# Patient Record
Sex: FEMALE | Race: WHITE | NOT HISPANIC OR LATINO | Employment: UNEMPLOYED | URBAN - METROPOLITAN AREA
[De-identification: names, ages, dates, MRNs, and addresses within clinical notes are randomized per-mention and may not be internally consistent; named-entity substitution may affect disease eponyms.]

---

## 2018-10-31 ENCOUNTER — APPOINTMENT (OUTPATIENT)
Dept: RADIOLOGY | Facility: CLINIC | Age: 53
End: 2018-10-31
Payer: COMMERCIAL

## 2018-10-31 ENCOUNTER — OFFICE VISIT (OUTPATIENT)
Dept: URGENT CARE | Facility: CLINIC | Age: 53
End: 2018-10-31
Payer: COMMERCIAL

## 2018-10-31 VITALS
HEART RATE: 80 BPM | TEMPERATURE: 98.4 F | BODY MASS INDEX: 18.32 KG/M2 | WEIGHT: 114 LBS | DIASTOLIC BLOOD PRESSURE: 60 MMHG | SYSTOLIC BLOOD PRESSURE: 108 MMHG | RESPIRATION RATE: 20 BRPM | HEIGHT: 66 IN | OXYGEN SATURATION: 98 %

## 2018-10-31 DIAGNOSIS — M79.671 RIGHT FOOT PAIN: Primary | ICD-10-CM

## 2018-10-31 DIAGNOSIS — M79.671 RIGHT FOOT PAIN: ICD-10-CM

## 2018-10-31 LAB
SL AMB  POCT GLUCOSE, UA: 0
SL AMB LEUKOCYTE ESTERASE,UA: 0
SL AMB POCT BILIRUBIN,UA: 0
SL AMB POCT BLOOD,UA: ABNORMAL
SL AMB POCT CLARITY,UA: CLEAR
SL AMB POCT COLOR,UA: ABNORMAL
SL AMB POCT KETONES,UA: 0
SL AMB POCT NITRITE,UA: 0
SL AMB POCT PH,UA: 0
SL AMB POCT SPECIFIC GRAVITY,UA: 0
SL AMB POCT URINE PROTEIN: 0
SL AMB POCT UROBILINOGEN: 0

## 2018-10-31 PROCEDURE — G0382 LEV 3 HOSP TYPE B ED VISIT: HCPCS | Performed by: PHYSICIAN ASSISTANT

## 2018-10-31 PROCEDURE — 99283 EMERGENCY DEPT VISIT LOW MDM: CPT | Performed by: PHYSICIAN ASSISTANT

## 2018-10-31 PROCEDURE — 73630 X-RAY EXAM OF FOOT: CPT

## 2018-10-31 RX ORDER — CIPROFLOXACIN 500 MG/1
500 TABLET, FILM COATED ORAL EVERY 12 HOURS SCHEDULED
Qty: 14 TABLET | Refills: 0 | Status: SHIPPED | OUTPATIENT
Start: 2018-10-31 | End: 2018-10-31 | Stop reason: CLARIF

## 2018-10-31 NOTE — PROGRESS NOTES
[facility]        NAME: Amy Jackson is a 47 y o  female  : 1965    MRN: [id number]  DATE: 2018  TIME: 1:37 PM    Assessment and Plan   Right foot pain [M79 [id number]]  1  Right foot pain  POCT urine dip    XR foot 3+ vw right    CANCELED: POCT urine dip auto non-scope    CANCELED: Urine culture    CANCELED: Urine culture         Patient Instructions       Follow up with podiatry if no improvement  Proceed to ER if symptoms worsen  Chief Complaint     Chief Complaint   Patient presents with    Foot Pain     started Saturday night seen in urgent care in [facility] to have pain and numbness         History of Present Illness       Patient presents with a four-day history of pain in her right foot  She states that the the pain started while walking around with and without shoes and it was on the plantar aspect beneath her 2nd and 3rd toes  Patient denies any known trauma, however, she broke a classification 2 weeks ago and not sure if she could have possibly stepped on a piece of glass  The foot now has lot ecchymosis and now has numbness over the 2nd toe which radiates up her right leg  Review of Systems   Review of Systems   Constitutional: Negative for fever  HENT: Negative for sore throat  Eyes: Negative for redness  Skin: Negative for rash  Neurological: Positive for numbness (Right foot)  Hematological: Negative for adenopathy  Current Medications     No current outpatient prescriptions on file  Current Allergies     Allergies as of 10/31/2018    (No Known Allergies)            The following portions of the patient's history were reviewed and updated as appropriate: allergies, current medications, past family history, past medical history, past social history, past surgical history and problem list      Past Medical History:   Diagnosis Date    PTSD (post-traumatic stress disorder)        History reviewed   No pertinent surgical history  No family history on file  Medications have been verified  Objective   /60   Pulse 80   Temp 98 4 °F (36 9 °C) (Tympanic)   Resp 20   Ht 5' 6" (1 676 m)   Wt 51 7 kg (114 lb)   SpO2 98%   BMI 18 40 kg/m²        Physical Exam     Physical Exam   Constitutional: She is oriented to person, place, and time  She appears well-developed and well-nourished  HENT:   Head: Normocephalic and atraumatic  Neck: No tracheal deviation present  Cardiovascular: Normal rate and regular rhythm  Pulmonary/Chest: Effort normal    Abdominal: Soft  Suprapubic tenderness no CVA tenderness  Musculoskeletal:   Ecchymosis over the distal 1st through 2nd metatarsals  There is no swelling, warmth or erythema to the right foot  Dorsalis pedis pulse is palpable  There is no evidence any plantar puncture wounds  Neurological: She is alert and oriented to person, place, and time  Skin: Skin is warm and dry  No rash noted  Psychiatric: She has a normal mood and affect  Her behavior is normal  Judgment and thought content normal    Nursing note and vitals reviewed  Right foot x-ray viewed by me and independently reviewed by me contemporaneously as no acute bony abnormality or radiopaque foreign body

## 2018-11-05 ENCOUNTER — OFFICE VISIT (OUTPATIENT)
Dept: FAMILY MEDICINE CLINIC | Facility: CLINIC | Age: 53
End: 2018-11-05
Payer: COMMERCIAL

## 2018-11-05 VITALS
BODY MASS INDEX: 18.68 KG/M2 | HEIGHT: 66 IN | TEMPERATURE: 98.2 F | DIASTOLIC BLOOD PRESSURE: 68 MMHG | WEIGHT: 116.2 LBS | SYSTOLIC BLOOD PRESSURE: 110 MMHG

## 2018-11-05 DIAGNOSIS — M79.671 RIGHT FOOT PAIN: ICD-10-CM

## 2018-11-05 DIAGNOSIS — F32.A DEPRESSION, UNSPECIFIED DEPRESSION TYPE: ICD-10-CM

## 2018-11-05 DIAGNOSIS — Z00.00 ANNUAL PHYSICAL EXAM: Primary | ICD-10-CM

## 2018-11-05 PROCEDURE — 99396 PREV VISIT EST AGE 40-64: CPT | Performed by: FAMILY MEDICINE

## 2018-11-05 NOTE — PROGRESS NOTES
Assessment/Plan:    No problem-specific Assessment & Plan notes found for this encounter  Diagnoses and all orders for this visit:    Annual physical exam  -     CBC and differential; Future  -     Comprehensive metabolic panel; Future  -     Lipid panel; Future  -     TSH, 3rd generation with Free T4 reflex; Future    Right foot pain  -     Ambulatory referral to Podiatry; Future    Depression, unspecified depression type  Comments:  pt refuses medication          Subjective:      Patient ID: Jenaro Mathur is a 47 y o  female  New pt here for annual physical with depression, anxiety and PTSD, pt has not been on any medication and is against the use of pharmacutical medication, pt complains of foot pain in the bottom of the right foot it started 1 week ago she went to an urgent care and it is getting better but she wants to see a podiatrist        The following portions of the patient's history were reviewed and updated as appropriate: allergies, current medications, past family history, past medical history, past social history, past surgical history and problem list     Review of Systems   Constitutional: Positive for fatigue  Negative for chills and fever  HENT: Negative  Eyes: Negative  Respiratory: Negative for shortness of breath and wheezing  Cardiovascular: Negative for chest pain and palpitations  Gastrointestinal: Negative for abdominal pain, blood in stool, constipation, diarrhea, nausea and vomiting  Endocrine: Negative  Genitourinary: Negative for dysuria  Musculoskeletal: Negative for arthralgias and myalgias  Skin: Negative  Allergic/Immunologic: Negative  Neurological: Negative for seizures and syncope  Hematological: Negative for adenopathy  Psychiatric/Behavioral: Negative            Objective:      /68 (BP Location: Left arm, Patient Position: Sitting, Cuff Size: Adult)   Temp 98 2 °F (36 8 °C) (Tympanic)   Ht 5' 6" (1 676 m)   Wt 52 7 kg (116 lb 3 2 oz)   BMI 18 76 kg/m²          Physical Exam   Constitutional: She is oriented to person, place, and time  She appears well-developed and well-nourished  No distress  HENT:   Head: Normocephalic and atraumatic  Right Ear: External ear normal    Left Ear: External ear normal    Nose: Nose normal    Mouth/Throat: Oropharynx is clear and moist    Eyes: Pupils are equal, round, and reactive to light  Conjunctivae and EOM are normal  No scleral icterus  Neck: Normal range of motion  Neck supple  Cardiovascular: Normal rate, regular rhythm and normal heart sounds  No murmur heard  Pulmonary/Chest: Effort normal and breath sounds normal  No respiratory distress  She has no wheezes  She has no rales  Abdominal: Soft  Bowel sounds are normal  She exhibits no distension and no mass  There is no tenderness  There is no rebound and no guarding  Musculoskeletal: Normal range of motion  She exhibits no edema  Lymphadenopathy:     She has no cervical adenopathy  Neurological: She is alert and oriented to person, place, and time  She has normal reflexes  She exhibits normal muscle tone  Skin: Skin is warm and dry  No rash noted  She is not diaphoretic  No erythema  No pallor  Psychiatric: She has a normal mood and affect  Her behavior is normal  Judgment and thought content normal    Nursing note and vitals reviewed

## 2018-12-03 ENCOUNTER — TRANSCRIBE ORDERS (OUTPATIENT)
Dept: ADMINISTRATIVE | Facility: HOSPITAL | Age: 53
End: 2018-12-03

## 2018-12-03 DIAGNOSIS — I73.9 PAD (PERIPHERAL ARTERY DISEASE) (HCC): Primary | ICD-10-CM

## 2018-12-03 DIAGNOSIS — M79.671 RIGHT FOOT PAIN: Primary | ICD-10-CM

## 2018-12-04 ENCOUNTER — APPOINTMENT (OUTPATIENT)
Dept: LAB | Facility: CLINIC | Age: 53
End: 2018-12-04
Payer: COMMERCIAL

## 2018-12-04 DIAGNOSIS — Z00.00 ANNUAL PHYSICAL EXAM: ICD-10-CM

## 2018-12-04 LAB
ALBUMIN SERPL BCP-MCNC: 3.6 G/DL (ref 3.5–5)
ALP SERPL-CCNC: 75 U/L (ref 46–116)
ALT SERPL W P-5'-P-CCNC: 29 U/L (ref 12–78)
ANION GAP SERPL CALCULATED.3IONS-SCNC: 5 MMOL/L (ref 4–13)
AST SERPL W P-5'-P-CCNC: 21 U/L (ref 5–45)
BASOPHILS # BLD AUTO: 0.05 THOUSANDS/ΜL (ref 0–0.1)
BASOPHILS NFR BLD AUTO: 1 % (ref 0–1)
BILIRUB SERPL-MCNC: 0.53 MG/DL (ref 0.2–1)
BUN SERPL-MCNC: 7 MG/DL (ref 5–25)
CALCIUM SERPL-MCNC: 9.8 MG/DL (ref 8.3–10.1)
CHLORIDE SERPL-SCNC: 102 MMOL/L (ref 100–108)
CHOLEST SERPL-MCNC: 194 MG/DL (ref 50–200)
CO2 SERPL-SCNC: 31 MMOL/L (ref 21–32)
CREAT SERPL-MCNC: 0.75 MG/DL (ref 0.6–1.3)
EOSINOPHIL # BLD AUTO: 0.06 THOUSAND/ΜL (ref 0–0.61)
EOSINOPHIL NFR BLD AUTO: 1 % (ref 0–6)
ERYTHROCYTE [DISTWIDTH] IN BLOOD BY AUTOMATED COUNT: 12.3 % (ref 11.6–15.1)
GFR SERPL CREATININE-BSD FRML MDRD: 91 ML/MIN/1.73SQ M
GLUCOSE P FAST SERPL-MCNC: 93 MG/DL (ref 65–99)
HCT VFR BLD AUTO: 42.5 % (ref 34.8–46.1)
HDLC SERPL-MCNC: 49 MG/DL (ref 40–60)
HGB BLD-MCNC: 14.2 G/DL (ref 11.5–15.4)
IMM GRANULOCYTES # BLD AUTO: 0 THOUSAND/UL (ref 0–0.2)
IMM GRANULOCYTES NFR BLD AUTO: 0 % (ref 0–2)
LDLC SERPL CALC-MCNC: 119 MG/DL (ref 0–100)
LYMPHOCYTES # BLD AUTO: 1.7 THOUSANDS/ΜL (ref 0.6–4.47)
LYMPHOCYTES NFR BLD AUTO: 34 % (ref 14–44)
MCH RBC QN AUTO: 31.4 PG (ref 26.8–34.3)
MCHC RBC AUTO-ENTMCNC: 33.4 G/DL (ref 31.4–37.4)
MCV RBC AUTO: 94 FL (ref 82–98)
MONOCYTES # BLD AUTO: 0.37 THOUSAND/ΜL (ref 0.17–1.22)
MONOCYTES NFR BLD AUTO: 7 % (ref 4–12)
NEUTROPHILS # BLD AUTO: 2.86 THOUSANDS/ΜL (ref 1.85–7.62)
NEUTS SEG NFR BLD AUTO: 57 % (ref 43–75)
NONHDLC SERPL-MCNC: 145 MG/DL
NRBC BLD AUTO-RTO: 0 /100 WBCS
PLATELET # BLD AUTO: 198 THOUSANDS/UL (ref 149–390)
PMV BLD AUTO: 10.3 FL (ref 8.9–12.7)
POTASSIUM SERPL-SCNC: 4.2 MMOL/L (ref 3.5–5.3)
PROT SERPL-MCNC: 7 G/DL (ref 6.4–8.2)
RBC # BLD AUTO: 4.52 MILLION/UL (ref 3.81–5.12)
SODIUM SERPL-SCNC: 138 MMOL/L (ref 136–145)
T4 FREE SERPL-MCNC: 0.8 NG/DL (ref 0.76–1.46)
TRIGL SERPL-MCNC: 130 MG/DL
TSH SERPL DL<=0.05 MIU/L-ACNC: 7.72 UIU/ML (ref 0.36–3.74)
WBC # BLD AUTO: 5.04 THOUSAND/UL (ref 4.31–10.16)

## 2018-12-04 PROCEDURE — 84439 ASSAY OF FREE THYROXINE: CPT

## 2018-12-04 PROCEDURE — 80053 COMPREHEN METABOLIC PANEL: CPT

## 2018-12-04 PROCEDURE — 36415 COLL VENOUS BLD VENIPUNCTURE: CPT

## 2018-12-04 PROCEDURE — 84443 ASSAY THYROID STIM HORMONE: CPT

## 2018-12-04 PROCEDURE — 80061 LIPID PANEL: CPT

## 2018-12-04 PROCEDURE — 85025 COMPLETE CBC W/AUTO DIFF WBC: CPT

## 2018-12-11 ENCOUNTER — HOSPITAL ENCOUNTER (OUTPATIENT)
Dept: MRI IMAGING | Facility: HOSPITAL | Age: 53
Discharge: HOME | End: 2018-12-11
Payer: COMMERCIAL

## 2018-12-11 DIAGNOSIS — M79.671 RIGHT FOOT PAIN: ICD-10-CM

## 2018-12-11 PROCEDURE — 73718 MRI LOWER EXTREMITY W/O DYE: CPT

## 2018-12-13 ENCOUNTER — TELEPHONE (OUTPATIENT)
Dept: FAMILY MEDICINE CLINIC | Facility: CLINIC | Age: 53
End: 2018-12-13

## 2018-12-13 ENCOUNTER — HOSPITAL ENCOUNTER (OUTPATIENT)
Dept: NON INVASIVE DIAGNOSTICS | Facility: HOSPITAL | Age: 53
Discharge: HOME | End: 2018-12-13
Payer: COMMERCIAL

## 2018-12-13 DIAGNOSIS — I73.9 PAD (PERIPHERAL ARTERY DISEASE) (HCC): ICD-10-CM

## 2018-12-13 PROCEDURE — 93923 UPR/LXTR ART STDY 3+ LVLS: CPT | Performed by: SURGERY

## 2018-12-13 PROCEDURE — 93923 UPR/LXTR ART STDY 3+ LVLS: CPT

## 2019-03-08 ENCOUNTER — HOSPITAL ENCOUNTER (INPATIENT)
Facility: HOSPITAL | Age: 54
LOS: 4 days | Discharge: HOME | DRG: 751 | End: 2019-03-12
Attending: EMERGENCY MEDICINE | Admitting: PSYCHIATRY & NEUROLOGY
Payer: COMMERCIAL

## 2019-03-08 DIAGNOSIS — E53.8 B12 DEFICIENCY: ICD-10-CM

## 2019-03-08 DIAGNOSIS — E55.9 VITAMIN D DEFICIENCY: ICD-10-CM

## 2019-03-08 DIAGNOSIS — F32.A DEPRESSION: Primary | ICD-10-CM

## 2019-03-08 LAB
ALBUMIN SERPL BCP-MCNC: 4 G/DL (ref 3.5–5.7)
ALP SERPL-CCNC: 51 U/L (ref 40–150)
ALT SERPL W P-5'-P-CCNC: 13 U/L (ref 7–52)
AMPHETAMINES SERPL QL SCN: NEGATIVE
ANION GAP SERPL CALCULATED.3IONS-SCNC: 5 MMOL/L (ref 4–13)
AST SERPL W P-5'-P-CCNC: 21 U/L (ref 13–39)
BARBITURATES UR QL: NEGATIVE
BASOPHILS # BLD AUTO: 0 THOUSANDS/ΜL (ref 0–0.1)
BASOPHILS NFR BLD AUTO: 1 % (ref 0–2)
BENZODIAZ UR QL: NEGATIVE
BILIRUB SERPL-MCNC: 0.5 MG/DL (ref 0.2–1)
BILIRUB UR QL STRIP: NEGATIVE
BUN SERPL-MCNC: 6 MG/DL (ref 7–25)
CALCIUM SERPL-MCNC: 9.3 MG/DL (ref 8.6–10.5)
CHLORIDE SERPL-SCNC: 105 MMOL/L (ref 98–107)
CLARITY UR: CLEAR
CO2 SERPL-SCNC: 29 MMOL/L (ref 21–31)
COCAINE UR QL: NEGATIVE
COLOR UR: ABNORMAL
CREAT SERPL-MCNC: 0.63 MG/DL (ref 0.6–1.2)
EOSINOPHIL # BLD AUTO: 0.1 THOUSAND/ΜL (ref 0–0.61)
EOSINOPHIL NFR BLD AUTO: 2 % (ref 0–5)
ERYTHROCYTE [DISTWIDTH] IN BLOOD BY AUTOMATED COUNT: 13.1 % (ref 11.5–14.5)
ETHANOL SERPL-MCNC: <10 MG/DL
EXT PREG TEST URINE: NEGATIVE
GFR SERPL CREATININE-BSD FRML MDRD: 102 ML/MIN/1.73SQ M
GLUCOSE SERPL-MCNC: 104 MG/DL (ref 65–99)
GLUCOSE UR STRIP-MCNC: NEGATIVE MG/DL
HCT VFR BLD AUTO: 40.9 % (ref 42–47)
HGB BLD-MCNC: 13.5 G/DL (ref 12–16)
HGB UR QL STRIP.AUTO: NEGATIVE
KETONES UR STRIP-MCNC: NEGATIVE MG/DL
LEUKOCYTE ESTERASE UR QL STRIP: NEGATIVE
LYMPHOCYTES # BLD AUTO: 1.4 THOUSANDS/ΜL (ref 0.6–4.47)
LYMPHOCYTES NFR BLD AUTO: 29 % (ref 21–51)
MCH RBC QN AUTO: 31.3 PG (ref 26–34)
MCHC RBC AUTO-ENTMCNC: 33.1 G/DL (ref 31–37)
MCV RBC AUTO: 95 FL (ref 81–99)
METHADONE UR QL: NEGATIVE
MONOCYTES # BLD AUTO: 0.4 THOUSAND/ΜL (ref 0.17–1.22)
MONOCYTES NFR BLD AUTO: 7 % (ref 2–12)
NEUTROPHILS # BLD AUTO: 2.8 THOUSANDS/ΜL (ref 1.4–6.5)
NEUTS SEG NFR BLD AUTO: 60 % (ref 42–75)
NITRITE UR QL STRIP: NEGATIVE
NRBC BLD AUTO-RTO: 0 /100 WBCS
OPIATES UR QL SCN: NEGATIVE
PCP UR QL: NEGATIVE
PH UR STRIP.AUTO: 7 [PH]
PLATELET # BLD AUTO: 199 THOUSANDS/UL (ref 149–390)
PMV BLD AUTO: 8.4 FL (ref 8.6–11.7)
POTASSIUM SERPL-SCNC: 4 MMOL/L (ref 3.5–5.5)
PROT SERPL-MCNC: 6.7 G/DL (ref 6.4–8.9)
PROT UR STRIP-MCNC: NEGATIVE MG/DL
RBC # BLD AUTO: 4.33 MILLION/UL (ref 3.9–5.2)
SODIUM SERPL-SCNC: 139 MMOL/L (ref 134–143)
SP GR UR STRIP.AUTO: <=1.005 (ref 1–1.03)
THC UR QL: NEGATIVE
UROBILINOGEN UR QL STRIP.AUTO: 0.2 E.U./DL
WBC # BLD AUTO: 4.7 THOUSAND/UL (ref 4.8–10.8)

## 2019-03-08 PROCEDURE — 80320 DRUG SCREEN QUANTALCOHOLS: CPT | Performed by: EMERGENCY MEDICINE

## 2019-03-08 PROCEDURE — 81025 URINE PREGNANCY TEST: CPT | Performed by: EMERGENCY MEDICINE

## 2019-03-08 PROCEDURE — 85025 COMPLETE CBC W/AUTO DIFF WBC: CPT | Performed by: EMERGENCY MEDICINE

## 2019-03-08 PROCEDURE — 99285 EMERGENCY DEPT VISIT HI MDM: CPT

## 2019-03-08 PROCEDURE — 36415 COLL VENOUS BLD VENIPUNCTURE: CPT | Performed by: EMERGENCY MEDICINE

## 2019-03-08 PROCEDURE — 80053 COMPREHEN METABOLIC PANEL: CPT | Performed by: EMERGENCY MEDICINE

## 2019-03-08 PROCEDURE — 81003 URINALYSIS AUTO W/O SCOPE: CPT | Performed by: EMERGENCY MEDICINE

## 2019-03-08 PROCEDURE — 93005 ELECTROCARDIOGRAM TRACING: CPT

## 2019-03-08 PROCEDURE — 80307 DRUG TEST PRSMV CHEM ANLYZR: CPT | Performed by: EMERGENCY MEDICINE

## 2019-03-08 RX ORDER — LORAZEPAM 2 MG/ML
1 INJECTION INTRAMUSCULAR EVERY 4 HOURS PRN
Status: DISCONTINUED | OUTPATIENT
Start: 2019-03-08 | End: 2019-03-12 | Stop reason: HOSPADM

## 2019-03-08 RX ORDER — HYDROXYZINE HYDROCHLORIDE 25 MG/1
25 TABLET, FILM COATED ORAL EVERY 4 HOURS PRN
Status: DISCONTINUED | OUTPATIENT
Start: 2019-03-08 | End: 2019-03-12 | Stop reason: HOSPADM

## 2019-03-08 RX ORDER — MAGNESIUM HYDROXIDE/ALUMINUM HYDROXICE/SIMETHICONE 120; 1200; 1200 MG/30ML; MG/30ML; MG/30ML
30 SUSPENSION ORAL EVERY 4 HOURS PRN
Status: DISCONTINUED | OUTPATIENT
Start: 2019-03-08 | End: 2019-03-12 | Stop reason: HOSPADM

## 2019-03-08 RX ORDER — ACETAMINOPHEN 325 MG/1
975 TABLET ORAL EVERY 6 HOURS PRN
Status: DISCONTINUED | OUTPATIENT
Start: 2019-03-08 | End: 2019-03-12 | Stop reason: HOSPADM

## 2019-03-08 RX ORDER — TRAZODONE HYDROCHLORIDE 50 MG/1
50 TABLET ORAL
Status: DISCONTINUED | OUTPATIENT
Start: 2019-03-08 | End: 2019-03-12 | Stop reason: HOSPADM

## 2019-03-08 RX ORDER — ACETAMINOPHEN 325 MG/1
650 TABLET ORAL EVERY 6 HOURS PRN
Status: DISCONTINUED | OUTPATIENT
Start: 2019-03-08 | End: 2019-03-12 | Stop reason: HOSPADM

## 2019-03-08 RX ORDER — LORAZEPAM 0.5 MG/1
0.5 TABLET ORAL EVERY 4 HOURS PRN
Status: DISCONTINUED | OUTPATIENT
Start: 2019-03-08 | End: 2019-03-12 | Stop reason: HOSPADM

## 2019-03-08 RX ORDER — ACETAMINOPHEN 325 MG/1
650 TABLET ORAL EVERY 4 HOURS PRN
Status: DISCONTINUED | OUTPATIENT
Start: 2019-03-08 | End: 2019-03-12 | Stop reason: HOSPADM
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
## 2019-03-08 NOTE — ED NOTES
Pt's facesheet and 201 were faxed to intake/referral dept for bed consideration at [facility]   Medical clearance note is in chart, however, additional labs were ordered since pt is being referred to [facility] and we are waiting final results

## 2019-03-08 NOTE — ED PROVIDER NOTES
History  Chief Complaint   Patient presents with    Psychiatric Evaluation     71-year-old female with history of PTSD and depression presents for psychiatric evaluation  Patient reports depression with increased suicidal ideations and plan to just end it " Patient denies homicidal ideations an audiovisual hallucinations  Patient reports no drug or alcohol abuse  Patient with no other recent trauma or complaints at this time      History provided by:  Patient   used: No    Psychiatric Evaluation   Presenting symptoms: suicidal thoughts    Associated symptoms: no abdominal pain, no chest pain and no headaches        None       Past Medical History:   Diagnosis Date    Depression     PTSD (post-traumatic stress disorder)        No past surgical history on file  History reviewed  No pertinent family history  I have reviewed and agree with the history as documented  Social History     Tobacco Use    Smoking status: Never Smoker    Smokeless tobacco: Never Used   Substance Use Topics    Alcohol use: No    Drug use: No        Review of Systems   Constitutional: Negative for chills and fever  HENT: Negative for rhinorrhea and sore throat  Eyes: Negative for visual disturbance  Respiratory: Negative for cough and shortness of breath  Cardiovascular: Negative for chest pain and leg swelling  Gastrointestinal: Negative for abdominal pain, diarrhea, nausea and vomiting  Genitourinary: Negative for dysuria  Musculoskeletal: Negative for back pain and myalgias  Skin: Negative for rash  Neurological: Negative for dizziness and headaches  Psychiatric/Behavioral: Positive for suicidal ideas  Negative for confusion  All other systems reviewed and are negative  Physical Exam  Physical Exam   Constitutional: She is oriented to person, place, and time  She appears well-developed and well-nourished     HENT:   Nose: Nose normal    Mouth/Throat: Oropharynx is clear and moist  No oropharyngeal exudate  Eyes: Pupils are equal, round, and reactive to light  Conjunctivae and EOM are normal  No scleral icterus  Neck: Normal range of motion  Neck supple  No JVD present  No tracheal deviation present  Cardiovascular: Normal rate, regular rhythm and normal heart sounds  No murmur heard  Pulmonary/Chest: Effort normal and breath sounds normal  No respiratory distress  She has no wheezes  She has no rales  Abdominal: Soft  Bowel sounds are normal  There is no tenderness  There is no guarding  Musculoskeletal: Normal range of motion  She exhibits no edema or tenderness  Neurological: She is alert and oriented to person, place, and time  No cranial nerve deficit or sensory deficit  She exhibits normal muscle tone  5/5 motor, nl sens   Skin: Skin is warm and dry  Psychiatric: Her speech is normal and behavior is normal  Cognition and memory are normal  She does not express impulsivity  She exhibits a depressed mood  She expresses suicidal ideation  She expresses no homicidal ideation  She expresses suicidal plans  She expresses no homicidal plans  Nursing note and vitals reviewed        Vital Signs  ED Triage Vitals [03/08/19 1205]   Temperature Pulse Respirations Blood Pressure SpO2   98 3 °F (36 8 °C) 72 18 108/64 96 %      Temp Source Heart Rate Source Patient Position - Orthostatic VS BP Location FiO2 (%)   Temporal Monitor Lying Right arm --      Pain Score       No Pain           Vitals:    03/08/19 1205   BP: 108/64   Pulse: 72   Patient Position - Orthostatic VS: Lying       Visual Acuity  Visual Acuity      Most Recent Value   L Pupil Size (mm)  4   R Pupil Size (mm)  4          ED Medications  Medications - No data to display    Diagnostic Studies  Results Reviewed     Procedure Component Value Units Date/Time    POCT pregnancy, urine [[id number]]  (Normal) Resulted:  03/08/19 1521    Lab Status:  Final result Updated:  03/08/19 1522     EXT PREG TEST UR (Ref: Negative) negative    Comprehensive metabolic panel [[id number]]  (Abnormal) Collected:  03/08/19 1445    Lab Status:  Final result Specimen:  Blood from Arm, Right Updated:  03/08/19 1511     Sodium 139 mmol/L      Potassium 4 0 mmol/L      Chloride 105 mmol/L      CO2 29 mmol/L      ANION GAP 5 mmol/L      BUN 6 mg/dL      Creatinine 0 63 mg/dL      Glucose 104 mg/dL      Calcium 9 3 mg/dL      AST 21 U/L      ALT 13 U/L      Alkaline Phosphatase 51 U/L      Total Protein 6 7 g/dL      Albumin 4 0 g/dL      Total Bilirubin 0 50 mg/dL      eGFR 102 ml/min/1 73sq m     Narrative:       National Kidney Disease Education Program recommendations are as follows:  GFR calculation is accurate only with a steady state creatinine  Chronic Kidney disease less than 60 ml/min/1 73 sq  meters  Kidney failure less than 15 ml/min/1 73 sq  meters      CBC and differential [[id number]]  (Abnormal) Collected:  03/08/19 1445    Lab Status:  Final result Specimen:  Blood from Arm, Right Updated:  03/08/19 1457     WBC 4 70 Thousand/uL      RBC 4 33 Million/uL      Hemoglobin 13 5 g/dL      Hematocrit 40 9 %      MCV 95 fL      MCH 31 3 pg      MCHC 33 1 g/dL      RDW 13 1 %      MPV 8 4 fL      Platelets 099 Thousands/uL      nRBC 0 /100 WBCs      Neutrophils Relative 60 %      Lymphocytes Relative 29 %      Monocytes Relative 7 %      Eosinophils Relative 2 %      Basophils Relative 1 %      Neutrophils Absolute 2 80 Thousands/µL      Lymphocytes Absolute 1 40 Thousands/µL      Monocytes Absolute 0 40 Thousand/µL      Eosinophils Absolute 0 10 Thousand/µL      Basophils Absolute 0 00 Thousands/µL     Ethanol [[id number]]  (Normal) Collected:  03/08/19 1309    Lab Status:  Final result Specimen:  Blood from Arm, Right Updated:  03/08/19 1339     Ethanol Lvl <10 mg/dL     Rapid drug screen, urine [[id number]]  (Normal) Collected:  03/08/19 1235    Lab Status:  Final result Specimen:  Urine Updated:  03/08/19 1255     Amph/Meth UR Negative Barbiturate Ur Negative     Benzodiazepine Urine Negative     Cocaine Urine Negative     Methadone Urine Negative     Opiate Urine Negative     PCP Ur Negative     THC Urine Negative    Narrative:       FOR MEDICAL PURPOSES ONLY  IF CONFIRMATION NEEDED PLEASE CONTACT THE LAB WITHIN 5 DAYS  Drug Screen Cutoff Levels:  AMPHETAMINE/METHAMPHETAMINES  1000 ng/mL  BARBITURATES     200 ng/mL  BENZODIAZEPINES     200 ng/mL  COCAINE      300 ng/mL  METHADONE      300 ng/mL  OPIATES      300 ng/mL  PHENCYCLIDINE     25 ng/mL  THC       50 ng/mL    UA w Reflex to Microscopic w Reflex to Culture [[id number]]  (Abnormal) Collected:  03/08/19 1225    Lab Status:  Final result Specimen:  Urine, Clean Catch Updated:  03/08/19 1237     Color, UA Straw     Clarity, UA Clear     Specific Gravity, UA <=1 005     pH, UA 7 0     Leukocytes, UA Negative     Nitrite, UA Negative     Protein, UA Negative mg/dl      Glucose, UA Negative mg/dl      Ketones, UA Negative mg/dl      Urobilinogen, UA 0 2 E U /dl      Bilirubin, UA Negative     Blood, UA Negative                 No orders to display              Procedures  ECG 12 Lead Documentation  Date/Time: 3/8/2019 2:57 PM  Performed by: Harini Quevedo DO  Authorized by: Harini Quevedo DO     Indications / Diagnosis:  Psych eval  ECG reviewed by me, the ED Provider: yes    Patient location:  ED  Previous ECG:     Previous ECG:  Unavailable    Comparison to cardiac monitor: Yes    Interpretation:     Interpretation: normal    Rate:     ECG rate:  72 BPM    ECG rate assessment: normal    Rhythm:     Rhythm: sinus rhythm    Ectopy:     Ectopy: none    QRS:     QRS axis:  Normal  Conduction:     Conduction: normal    ST segments:     ST segments:  Normal           Phone Contacts  ED Phone Contact    ED Course  ED Course as of Mar 08 1657   Fri Mar 08, 2019   1158 Patient seen and examined at bedside  Labs ordered        1340 Nursing to call behavioral health representative to alert them to come to the emergency department to evaluate patient for appropriateness of admission  Patient is medically cleared for psychiatric evaluation and in-patient treatment  1430 201 signed inpatient chart  MDM    Disposition  Final diagnoses:   Depression     Time reflects when diagnosis was documented in both MDM as applicable and the Disposition within this note     Time User Action Codes Description Comment    3/8/2019  4:15 PM Annelle Hatchet [F32 9] Depression       ED Disposition     None      MD Documentation      Most Recent Value   Sending MD Dr Ariadna Burnham MD      Follow-up Information    None         Patient's Medications    No medications on file     No discharge procedures on file      ED Provider  Electronically Signed by           Enrique Brady DO  03/08/19 2272

## 2019-03-08 NOTE — ED NOTES
Insurance Authorization:   Phone call placed to [facility]  Phone number: [phone]  Spoke to Jule Koyanagi  4 days approved  Level of care: I/P psych  Review on 03/11/19  Authorization # [id number]  EVS (Eligibility Verification System) called - [phone]    Automated system indicates: eligible for M/A - behavioral health is Managed [facility] - Rec # [id number]

## 2019-03-08 NOTE — ED NOTES
Patient is accepted at [address]  Patient is accepted by Chely Dobbs, [address] per CRNP  Patient may go to the floor once RN report is called  Nurse report is to be called to [phone] prior to patient transfer

## 2019-03-08 NOTE — ED NOTES
Pt presents due SI w/ a plan to hang herself or O/D on tylenol and/or alcohol  Pt is A & O X 4 and was able to answer all assessment questions w/ appropriate elaboration  Pt continues to express SI and does not feel safe at this time  Pt denies any HI or thoughts to hurt others  Pt denies any AH, VH, or delusions  Pt is experiencing increased depression and increased anxiety w/ panic attacks  Pt also experiencing increased irritability and admits to "yelling at God" out of frustration, however, pt denies having acted out aggressively in any other way  Pt further states that her appetite is up and down and her sleep is unstable, e g  at times has difficulty falling asleep and staying asleep and at other times she has difficulty waking up  Pt has feelings of loneliness and states she has very poor support system  Pt also expresses feelings of hopelessness  Pt states she is hyper-sensitive to many social situations and her social anxiety is often a trigger for her PTSD and causes her to have flashbacks  Pt has O/P services in place and feels they are not adequate at this time and is in agreement w/ I/P psych admission  Voluntary paperwork and pt's rights were explained to pt in detail and pt signed a 201

## 2019-03-09 PROBLEM — F33.2 SEVERE EPISODE OF RECURRENT MAJOR DEPRESSIVE DISORDER, WITHOUT PSYCHOTIC FEATURES (HCC): Status: ACTIVE | Noted: 2019-03-09

## 2019-03-09 LAB
ATRIAL RATE: 72 BPM
P AXIS: 61 DEGREES
PR INTERVAL: 118 MS
QRS AXIS: 80 DEGREES
QRSD INTERVAL: 82 MS
QT INTERVAL: 376 MS
QTC INTERVAL: 411 MS
T WAVE AXIS: 68 DEGREES
VENTRICULAR RATE: 72 BPM

## 2019-03-09 PROCEDURE — 93010 ELECTROCARDIOGRAM REPORT: CPT | Performed by: INTERNAL MEDICINE

## 2019-03-09 PROCEDURE — 99221 1ST HOSP IP/OBS SF/LOW 40: CPT | Performed by: PSYCHIATRY & NEUROLOGY

## 2019-03-09 NOTE — H&P
TREATMENT PLAN REVIEW - [address] 46 y o  1965 female MRN: [id number]    [address]  Room / Bed: [address] 836-46 Encounter: [id number]          Admit Date/Time:  3/8/2019 12:05 PM    Treatment Team: Attending Provider: Harini Alcantara MD; Patient Care Assistant: Koby Chapin, RN; Consulting Physician: Zaheer Rodrigues MD; Patient Care Assistant: Aby Rhoades; Patient Care Assistant: Mariya Villalta; Registered Nurse: Waqas Herrera RN; Physician Assistant: Jami Horner PA-C    Diagnosis: Principal Problem:    Severe episode of recurrent major depressive disorder, without psychotic features [facility])      Patient Strengths/Assets: average or above intelligence, capable of independent living, communication skills, good physical health    Patient Barriers/Limitations: difficulty adapting, homeless, lack of social/family support, lack of stable employment, limited support system, patient is unwilling to work on problems    Short Term Goals: decrease in depressive symptoms, decrease in anxiety symptoms, decrease in suicidal thoughts    Long Term Goals: resolution of depressive symptoms, stabilization of mood, free of suicidal thoughts, acceptance of need for psychiatric medications, acceptance of need for psychiatric treatment, acceptance of need for psychiatric follow up after discharge, acceptance of psychiatric diagnosis    Progress Towards Goals: starting psychiatric medications as prescribed, continue psychiatric medications as prescribed, attends groups more often, participates in milieu therapy, participates more in milieu therapy    Recommended Treatment: medication management, patient medication education, group therapy, milieu therapy, continued Behavioral Health psychiatric evaluation/assessment process    Treatment Frequency: daily medication monitoring, group and milieu therapy daily, monitoring through interdisciplinary rounds, monitoring through weekly patient care conferences    Expected Discharge Date:  3/12/19    Discharge Plan: referral for outpatient medication management with a psychiatrist, referral for outpatient psychotherapy    Treatment Plan Created/Updated By: Cheyenne Omer MD
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
## 2019-03-09 NOTE — PROGRESS NOTES
Pt signed 72 hr notice @ [phone]  Pt denies any further suicidal or homicidal ideations @ present

## 2019-03-09 NOTE — PROGRESS NOTES
Patient upset over 7" checks as she does not want to be checked on and wants door to room closed  Staff attempted to educate patient on purpose of checks, unable to redirect patient  Patient appears to be sleeping well thus far, no noted behaviors  Remains on 7" checks for safety and behaviors

## 2019-03-09 NOTE — PROGRESS NOTES
[id number]  Patient keeps to herself and is withdrawn to room  Having complaints of unit being too loud and being unable to sleep  She is refusing any PRN medications and other interventions offered  Will continue monitoring

## 2019-03-09 NOTE — PROGRESS NOTES
Pt c/o depression 8/10 & anxiety 5/10  Pt denies any suicidal or homicidal ideations @ present  Pt able to verbally contract for safety  Q 7 min checks maintained to monitor pt's behavior & safety  Pt tearful & keeps to herself  Pt naps in her room in between meals

## 2019-03-09 NOTE — PROGRESS NOTES
Pt wesho @ times  Pt refuses any PRN meds for anxiety @ this time  Pt is withdrawn & stays in her room  Pt doesn't socialize with other patients

## 2019-03-09 NOTE — PLAN OF CARE
Problem: SELF HARM/SUICIDALITY  Goal: Will have no self-injury during hospital stay  Description  INTERVENTIONS:  - Q 15 MINUTES: Routine safety checks  - Q WAKING SHIFT & PRN: Assess risk to determine if routine checks are adequate to maintain patient safety  - Encourage patient to participate actively in care by formulating a plan to combat response to suicidal ideation, identify supports and resources  Outcome: Progressing     Problem: DEPRESSION  Goal: Will be euthymic at discharge  Description  INTERVENTIONS:  - Provide emotional support via 1:1 interaction with staff  - Encourage involvement in milieu/groups/activities  - Monitor for social isolation   Outcome: Progressing     Problem: ANXIETY  Goal: Will report anxiety at manageable levels  Description  INTERVENTIONS:  - Administer medication as ordered  - Teach and encourage coping skills  - Assess patient/family for anxiety and ability to cope   Outcome: Progressing  Goal: By discharge: Patient will verbalize 2 strategies to deal with anxiety  Description  Interventions:  - Identify any obvious source/trigger to anxiety  - Staff will assist patient in applying identified coping technique/skills  - Encourage attendance of scheduled groups and activities  Outcome: Progressing     Problem: PAIN - ADULT  Goal: Verbalizes/displays adequate comfort level or baseline comfort level  Description  Interventions:  - Encourage patient to monitor pain and request assistance  - Assess pain using appropriate pain scale  - Administer analgesics based on type and severity of pain and evaluate response  - Implement non-pharmacological measures as appropriate and evaluate response  - Consider cultural and social influences on pain and pain management  - Notify physician/advanced practitioner if interventions unsuccessful or patient reports new pain  Outcome: Progressing     Care plan initiated, monitoring is ongoing
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
## 2019-03-09 NOTE — H&P
Aime Santoro#  :1965 F  EYA:[id number]    FSC:[id number]  Adm Date: 3/8/2019 1205  12:05 PM   ATT PHY: Tru Wilcox, [address]         Chief Complaint: Suicidal Ideation with plan  History of Presenting Illness: Erasmo Soliman is a(n) 46y o  year old female with a past psychiatric history significant for PTSD presenting to the [facility] in [facility] for suicidal ideations with plan to hang herself or overdose using Tylenol and/or alcohol  She admits to increased depression, anxiety, panic attacks, irritability, poor sleep, poor appetite, and feelings of hopelessness  The patient was seen after reviewing the chart and discussing the case with caring staff  Today during our encounter, the patient reported no acute concerns  Allergies   Allergen Reactions    Dairy Aid [Lactase]     Nickel     Other      [place]    Wheat Bran      Patient gets "puffy"       No current facility-administered medications on file prior to encounter  No current outpatient medications on file prior to encounter  Active Ambulatory Problems     Diagnosis Date Noted    No Active Ambulatory Problems     Resolved Ambulatory Problems     Diagnosis Date Noted    No Resolved Ambulatory Problems     Past Medical History:   Diagnosis Date    Depression     PTSD (post-traumatic stress disorder)        No past surgical history on file  History reviewed  No pertinent family history  Review of Systems   Gastrointestinal: Positive for diarrhea  Neurological: Positive for headaches  All other systems reviewed and are negative  Vitals:    19 0717   BP: 114/63   Pulse: 95   Resp: 18   Temp: (!) 96 °F (35 6 °C)   SpO2: 100%       Physical Exam   Constitutional: Awake and Alert  Well-developed and well-nourished  No distress  HENT:   Head: Normocephalic and atraumatic     Mouth/Throat: Oropharynx is clear and moist     Neck: Neck supple  No tracheal deviation present  No thyromegaly present  No lymphadenopathy  Cardiovascular: RRR with normal heart sounds  Exam reveals no friction rub  No murmur heard  Pulmonary/Chest: Effort normal and breath sounds normal  No respiratory distress  Patient has no wheezes, rales, or rhonchi  Abdominal: Soft  Bowel sounds are normal  No distension  There is no tenderness  There is no rebound and no guarding  Neurological: Cranial Nerves grossly intact  No sensory deficit  Coordination normal    Skin: Skin is warm and dry  No rash noted  No diaphoresis  No erythema  No edema  No cyanosis  Assessment     Tino Davis is a(n) 46y o  year old female with MDD    1  Headaches  Patient may have Tylenol as needed for this  2  Diarrhea  Seems to be stable at this time  Encouraged PO fluids  Will continue to monitor  3  PTSD/MDD  This will be managed by the psych team     Prognosis: Fair  Discharge Plan: In progress  Advanced Directives: I have discussed in detail the patient the advanced directives  The patient does not have a POA or a living will  The patient has nobody listed as her first contact  When discussing cardiac and pulmonary resuscitation efforts with the patient, the patient wishes to be a FULL CODE, though is against life support measures  I have spent more than 40 minutes gathering data, doing physical examination, and discussing the advanced directives, which was witnessed by caring staff  The patient was discussed with Dr Arlet Santoro and he is in agreement with the above note

## 2019-03-10 LAB
25(OH)D3 SERPL-MCNC: 31.7 NG/ML (ref 30–100)
VIT B12 SERPL-MCNC: 325 PG/ML (ref 100–900)

## 2019-03-10 PROCEDURE — 82607 VITAMIN B-12: CPT | Performed by: PHYSICIAN ASSISTANT

## 2019-03-10 PROCEDURE — 82306 VITAMIN D 25 HYDROXY: CPT | Performed by: PHYSICIAN ASSISTANT

## 2019-03-10 PROCEDURE — 99231 SBSQ HOSP IP/OBS SF/LOW 25: CPT | Performed by: PSYCHIATRY & NEUROLOGY

## 2019-03-10 NOTE — PROGRESS NOTES
Pt is withdrawn & remains in her room except for meals  Pt denies any suicidal or homicidal ideations  Q 7 min checks maintained to monitor pt's behavior & safety  Pt denies any depression or anxiety  Pt is anxious to be discharged today or tomorrow  Pt was seen by Dr Drew Roth & will not be discharged today

## 2019-03-10 NOTE — PROGRESS NOTES
Aime Santoro#  :1965 F  BRN:[id number]    AUM:[id number]  Adm Date: 3/8/2019 1205  12:05 PM   ATT PHY: Zeny Tate, [address]         Subjective     The patient was seen after reviewing the chart and discussing the case with caring staff  Today during our encounter, the patient reported no acute concerns  Patient's lab work is pending  Patient signed a 72 hour notice  Objective     Vitals:    19   BP: 95/60   Pulse: 73   Resp: 18   Temp: (!) 95 °F (35 °C)   SpO2: 96%       General Appearance: Awake and Alert  No acute distress  HEENT: Normocephalic, atraumatic  PERRLA, EOMI, MMM  Heart: RRR, no murmurs  Normal S1 and S2   Lungs: CTA bilaterally with fair air entry  Assessment     Claudia Gatavojeremiah is a(n) 46y o  year old female with MDD     1  Headaches  Stable  Patient may have Tylenol as needed for this  2  Diarrhea  Seems to be stable at this time  Encouraged PO fluids  Will continue to monitor  The patient was discussed with Dr Brooklynn Villalta and he is in agreement with the above note

## 2019-03-10 NOTE — PROGRESS NOTES
Patient withdrawn to her room  Refused HS snack  Upon approach patient's mood and affect is blunted but brightens with conversation  Calm and polite  Patient denies anxiety and depression  Patient requested chapstick and ear plugs  Patient appreciative for the items requested  Denies SI/HI/hallucinations/pain  Offered PRN medication for sleep patient refused  No behavioral issues  No complaints or concerns  Will continue to monitor safety and behaviors every 7 minutes

## 2019-03-10 NOTE — PROGRESS NOTES
Pt remains isolated to her room  Pt keeps to herself & is flat  Q 7 min checks maintained to monitor pt's behavior & safety

## 2019-03-10 NOTE — PROGRESS NOTES
Progress Note - Behavioral Health   Kim Santana 46 y o  female MRN: [id number]  Unit/Bed#: [facility] 203-02 Encounter: [id number]    Assessment/Plan   Principal Problem:    Severe episode of recurrent major depressive disorder, without psychotic features ([address] )      Behavior over the last 24 hours:  improved  Sleep: normal  Appetite: normal  Medication side effects: No  ROS: no complaints    Mental Status Evaluation:  Appearance:  casually dressed   Behavior:  psychomotor retardation   Speech:  soft   Mood:  constricted   Affect:  mood-congruent   Thought Process:  goal directed   Thought Content:  normal   Perceptual Disturbances: None   Risk Potential: nil SI/HI   Sensorium:  person, place, time/date and situation   Cognition:  grossly intact   Consciousness:  awake    Attention: attention span and concentration were age appropriate   Insight:  fair   Judgment: fair   Gait/Station: normal gait/station   Motor Activity: no abnormal movements     Progress Toward Goals: Pt reports an improved mood at this time denies anhedonia or despair with improved sleep  Pt denies suicidal ideation intent or plan and is keen on discharge  Pt signed a 72 hr notice  Recommended Treatment:  1-Cont current treatment  2- Continue with group therapy, milieu therapy and occupational therapy  Risks, benefits and possible side effects of Medications:   Risks, benefits, and possible side effects of medications explained to patient and patient verbalizes understanding        Medications:   current meds:   Current Facility-Administered Medications   Medication Dose Route Frequency    acetaminophen (TYLENOL) tablet 650 mg  650 mg Oral Q6H PRN    acetaminophen (TYLENOL) tablet 650 mg  650 mg Oral Q4H PRN    acetaminophen (TYLENOL) tablet 975 mg  975 mg Oral Q6H PRN    aluminum-magnesium hydroxide-simethicone (MYLANTA) 200-200-20 mg/5 mL oral suspension 30 mL  30 mL Oral Q4H PRN    hydrOXYzine HCL (ATARAX) tablet 25 mg  25 mg Oral Q4H PRN    LORazepam (ATIVAN) 2 mg/mL injection 1 mg  1 mg Intramuscular Q4H PRN    LORazepam (ATIVAN) tablet 0 5 mg  0 5 mg Oral Q4H PRN    magnesium hydroxide (MILK OF MAGNESIA) 400 mg/5 mL oral suspension 30 mL  30 mL Oral Daily PRN    traZODone (DESYREL) tablet 50 mg  50 mg Oral HS PRN     Labs: I have personally reviewed pt's labs    Counseling / Coordination of Care  Total floor / unit time spent today 25 minutes  Greater than 50% of total time was spent with the patient and / or family counseling and / or coordination of care   A description of the counseling / coordination of care:

## 2019-03-11 VITALS
WEIGHT: 113.6 LBS | OXYGEN SATURATION: 97 % | BODY MASS INDEX: 18.26 KG/M2 | TEMPERATURE: 96.5 F | RESPIRATION RATE: 18 BRPM | HEIGHT: 66 IN | DIASTOLIC BLOOD PRESSURE: 55 MMHG | HEART RATE: 67 BPM | SYSTOLIC BLOOD PRESSURE: 105 MMHG

## 2019-03-11 PROCEDURE — 99232 SBSQ HOSP IP/OBS MODERATE 35: CPT | Performed by: PSYCHIATRY & NEUROLOGY

## 2019-03-11 RX ORDER — MELATONIN
1000 DAILY
Status: DISCONTINUED | OUTPATIENT
Start: 2019-03-11 | End: 2019-03-12 | Stop reason: HOSPADM

## 2019-03-11 RX ORDER — CYANOCOBALAMIN 1000 UG/ML
1000 INJECTION INTRAMUSCULAR; SUBCUTANEOUS
Status: DISCONTINUED | OUTPATIENT
Start: 2019-03-11 | End: 2019-03-11

## 2019-03-11 RX ORDER — CHOLECALCIFEROL (VITAMIN D3) 125 MCG
1000 CAPSULE ORAL DAILY
Status: DISCONTINUED | OUTPATIENT
Start: 2019-03-11 | End: 2019-03-12 | Stop reason: HOSPADM

## 2019-03-11 RX ADMIN — VITAMIN D, TAB 1000IU (100/BT) 1000 UNITS: 25 TAB at 13:47

## 2019-03-11 RX ADMIN — CYANOCOBALAMIN TAB 500 MCG 1000 MCG: 500 TAB at 13:47

## 2019-03-11 NOTE — PLAN OF CARE
Problem: SELF HARM/SUICIDALITY  Goal: Will have no self-injury during hospital stay  Description  INTERVENTIONS:  - Q 15 MINUTES: Routine safety checks  - Q WAKING SHIFT & PRN: Assess risk to determine if routine checks are adequate to maintain patient safety  - Encourage patient to participate actively in care by formulating a plan to combat response to suicidal ideation, identify supports and resources  3/11/2019 1954 by Angel Russell RN  Outcome: Adequate for Discharge  3/11/2019 1424 by Angel Russell RN  Outcome: Progressing     Problem: DEPRESSION  Goal: Will be euthymic at discharge  Description  INTERVENTIONS:  - Provide emotional support via 1:1 interaction with staff  - Encourage involvement in milieu/groups/activities  - Monitor for social isolation   3/11/2019 1954 by Angel Russell RN  Outcome: Adequate for Discharge  3/11/2019 1424 by Angel Russell RN  Outcome: Progressing     Problem: ANXIETY  Goal: Will report anxiety at manageable levels  Description  INTERVENTIONS:  - Administer medication as ordered  - Teach and encourage coping skills  - Assess patient/family for anxiety and ability to cope   3/11/2019 1954 by Angel Russell RN  Outcome: Adequate for Discharge  3/11/2019 1424 by Angel Russell RN  Outcome: Progressing  Goal: By discharge: Patient will verbalize 2 strategies to deal with anxiety  Description  Interventions:  - Identify any obvious source/trigger to anxiety  - Staff will assist patient in applying identified coping technique/skills  - Encourage attendance of scheduled groups and activities  3/11/2019 1954 by Angel Russell RN  Outcome: Adequate for Discharge  3/11/2019 1424 by Angel Russell RN  Outcome: Progressing     Problem: PAIN - ADULT  Goal: Verbalizes/displays adequate comfort level or baseline comfort level  Description  Interventions:  - Encourage patient to monitor pain and request assistance  - Assess pain using appropriate pain scale  - Administer analgesics based on type and severity of pain and evaluate response  - Implement non-pharmacological measures as appropriate and evaluate response  - Consider cultural and social influences on pain and pain management  - Notify physician/advanced practitioner if interventions unsuccessful or patient reports new pain  3/11/2019 1954 by Sherly Michele RN  Outcome: Adequate for Discharge  3/11/2019 1424 by Sherly Michele RN  Outcome: Progressing     Problem: Ineffective Coping  Goal: Participates in unit activities  Description  Interventions:  - Provide therapeutic environment   - Provide required programming   - Redirect inappropriate behaviors   Outcome: Adequate for Discharge     Problem: DISCHARGE PLANNING - CARE MANAGEMENT  Goal: Discharge to post-acute care or home with appropriate resources  Description  INTERVENTIONS:  - Conduct assessment to determine patient/family and health care team treatment goals, and need for post-acute services based on payer coverage, community resources, and patient preferences, and barriers to discharge  - Address psychosocial, clinical, and financial barriers to discharge as identified in assessment in conjunction with the patient/family and health care team  - Arrange appropriate level of post-acute services according to patient?s   needs and preference and payer coverage in collaboration with the physician and health care team  - Communicate with and update the patient/family, physician, and health care team regarding progress on the discharge plan  - Arrange appropriate transportation to post-acute venues  Outcome: Adequate for Discharge

## 2019-03-11 NOTE — PROGRESS NOTES
Aime Santoro#  :1965 F  VKN:[id number]    [id number]:[id number]  Adm Date: 3/8/2019 1205  12:05 PM   ATT PHY: Tru Wilcox, [address]         Subjective     The patient was seen after reviewing the chart and discussing the case with caring staff  Today during our encounter, the patient reported no acute concerns  Patient's Vitamin B12 level is slightly low at 325 and Vitamin D level is also slightly low at 31 7  Patient is a possible discharge tomorrow  Objective     Vitals:    19 0722   BP: 112/64   Pulse: 77   Resp: 18   Temp: (!) 97 1 °F (36 2 °C)   SpO2: 96%       General Appearance: Awake and Alert  No acute distress  HEENT: Normocephalic, atraumatic  PERRLA, EOMI, MMM  Heart: RRR, no murmurs  Normal S1 and S2   Lungs: CTA bilaterally with fair air entry  Assessment     Erasmo Soliman is a(n) 46y o  year old female with MDD     1  Headaches  Stable  Patient may have Tylenol as needed for this  2  Diarrhea  Seems to be stable at this time  Encouraged PO fluids  Will continue to monitor  3  Vitamin B12 Deficiency  Patient has been started on cyanocobalamin 1000mcg by mouth once daily  Patient opted to not get the injection form of the medication  4  Vitamin D Deficiency  Patient has been started on Vitamin D3 1000U daily  The patient was discussed with Dr Rere Valdovinos and he is in agreement with the above note

## 2019-03-11 NOTE — PROGRESS NOTES
Patient visible out in milieu, pleasant and cooperative  Patient offer no complaints today, some anxiety due to being here in hospital otherwise engaged in activity booklet  Patient denies SI/HI/AH/VH/anxiety/depression/pain  Will continue to monitor

## 2019-03-11 NOTE — PROGRESS NOTES
Patient    Slept  Throughout  The  Night  No  Complaints voiced   , no thoughts  Of  Other  Directed  Harm  Voiced supportive  tali  Ongoing

## 2019-03-11 NOTE — SOCIAL WORK
CM met with PT  PT reviewed [place] plan in agreement and signed  PT signed GUILLAUME for [facility], [facility], PCP, and BCM  Pamela through [facility]  Completed psych soc  PT denies SI/HI/AH/VH  PT indicated primary stressors are finances, homelessness  PT indicated strengths are include being independent, caring, working with people  PT coping skills include spending time in nature  Limitations are finances and homelessness  PT parents passed a few years ago and she had several failed business and the combination is what PT feels greatly contributed to her regression  PT indicated that she has no fiances but jenni  $150  PT indicated that she has been staying with a friend but will be relocating at end of month with another Friend  PT indicated she believes in natural healing, not taking medication, she feels she needs to work through her sadness to heal  PT indicated that she has not family involved but a few friends in and out of state  PT indicated that she graduated from [employer] 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  Lost all her savings in 2008  PT declined follow up with PCP  PT declined reaching out to friends or family with regard to her care  PT indicated she roldan snot have access to firearms  PT declines past HX of mental health issues  PT denies legal issues  CM faxed referral to [facility], spoke with Jodi Barbosa she will review and follow up on acceptance  PT in agreement to partial program      [facility] [address]  Ja Deluca indicated that she is working with her to improve finances and for housing  Ja Deluca is in agreement to PT going to partial at D/C Ja Deluca indicated that PT is holistic and does not believe in medicine       CM spoke with Jodi Barbosa from [facility]  She has accepted PT and they will pick her up at 7am and transport directly to partial program  They will then take her to her car thereafter after [facility]

## 2019-03-11 NOTE — PROGRESS NOTES
Progress Note - Behavioral Health     Morales Mac 46 y o  female MRN: [id number]   Unit/Bed#: [facility] 203-02 Encounter: [id number]    Behavior over the last 24 hours: slowly improving  Bryn Rehman is continuing to be tearful and depressed but no longer has suicidal ideation of wanting to hang herself or take an overdose of medications today  Compliant with medications  Follows directions appropriately  More visible on the unit  Sleep: slept better  Appetite: normal  Medication side effects: No   ROS: decrease in depression and anxiety but still tearful  Mental Status Evaluation:    Appearance:  dressed appropriately   Behavior:  calm   Speech:  normal rate and volume   Mood:  dysphoric, anxious   Affect:  tearful   Thought Process:  normal rate of thoughts, normal abstract reasoning   Associations: concrete associations   Thought Content:  no overt delusions   Perceptual Disturbances: no auditory hallucinations, no visual hallucinations   Risk Potential: Suicidal ideation - None at present  Homicidal ideation - None at present  Potential for aggression - No   Sensorium:  oriented to person, place and time/date   Memory:  recent and remote memory grossly intact   Consciousness:  alert and awake   Attention: attention span and concentration are age appropriate   Insight:  improving   Judgment: improving   Gait/Station: normal gait/station   Motor Activity: no abnormal movements     Vital signs in last 24 hours:    Temp:  [96 8 °F (36 °C)-97 7 °F (36 5 °C)] 97 1 °F (36 2 °C)  HR:  [69-77] 77  Resp:  [18] 18  BP: ()/(52-64) 112/64    Laboratory results: I have personally reviewed all pertinent laboratory/tests results  Progress Toward Goals: improving, progressing    Assessment/Plan   Principal Problem:    Severe episode of recurrent major depressive disorder, without psychotic features ([facility] )    Recommended Treatment:     Planned medication and treatment changes:     All current active medications have been reviewed  Encourage group therapy, milieu therapy and occupational therapy  Behavioral Health checks every 7 minutes  Signed 72 hour notice  Wishes only to be treated holistically and does not want to take antidepressant medications  Current Facility-Administered Medications:  acetaminophen 650 mg Oral Q6H PRN [facility], CRNP   acetaminophen 650 mg Oral Q4H PRN [facility], CRNP   acetaminophen 975 mg Oral Q6H PRN [facility], CRNP   aluminum-magnesium hydroxide-simethicone 30 mL Oral Q4H PRN [facility], CRNP   cholecalciferol 1,000 Units Oral Daily Cleola Cater [place], PA-C   cyanocobalamin 1,000 mcg Oral Daily Cleola Cater [place], PA-C   hydrOXYzine HCL 25 mg Oral Q4H PRN [facility], CRNP   LORazepam 1 mg Intramuscular Q4H PRN [facility], CRNP   LORazepam 0 5 mg Oral Q4H PRN [facility], CRNP   magnesium hydroxide 30 mL Oral Daily PRN [facility], CRNP   traZODone 50 mg Oral HS PRN [facility], CRNP       Risks / Benefits of Treatment:    Risks, benefits, and possible side effects of medications explained to patient and patient verbalizes understanding and agreement for treatment  Counseling / Coordination of Care:    Patient's progress discussed with staff in treatment team meeting  Medications, treatment progress and treatment plan reviewed with patient      Gonzalez Morales MD 03/11/19

## 2019-03-11 NOTE — DISCHARGE INSTR - OTHER ORDERS
You are being discharged to your home located at [address] [place] [address]  Phone: [phone]  Triggers you have identified during your hospitalization that led to your distressed mood, include homelessness and limited fiances  Coping skills you have identified during your hospitalization include spending time in nature  If you are unable to deal with your distressed mood alone please contact your [facility] at [place] [address] at [phone], [facility] Primary Care Physician Dr Becky Shah, DO at [phone], your [facility]  through [place] at [phone], or your provider at [address] [place] [address]  at   If that is not effective and you continue to have suicidal ideation,distressed mood, overwhelmed, in crisis please contact [facility] at [phone], call 911, or go to the emergency room  [facility] [place] Crisis Hotline: 255.327.5816  *National Suicide Prevention Lifeline:  7-342.419.7886  *Peer Support Talk Line (Seven days a week, 1:00 PM  9:00 PM) Call: 517.200.5779 or Text: 529.236.5220  *Kristan on [address] [facility] HELPLINE: 363.403.2406/Website: www kwabena org  *Substance Abuse and [address] [employer], which is a confidential, free, 24-hour-a-day, 365-day-a-year, information service for individuals and family members facing mental health and/or substance use disorders  This service provides referrals to local treatment facilities, support groups, and community-based organizations  Callers can also order free publications and other information  Call 6-165.310.1440/Website: www [facility] gov  *[facility] 2-1-1: This is a toll free, confidential, 24-hour-a-day service which connects you to a community  in your area who can help you find services and resources that are available to you locally and provide critical services that can improve and save lives    Call: 03 51 58 72 24 /Website: https://neo jim/

## 2019-03-11 NOTE — DISCHARGE INSTRUCTIONS
Contact Information: If you have any questions, concerns, pended studies, tests and/or procedures, or emergencies regarding your inpatient behavioral health visit  Please contact [facility] older adult behavioral health unit [phone] and ask to speak to a , nurse or physician  A contact is available 24 hours/ 7 days a week at this number  Summary of Procedures Performed During your Stay:  Below is a list of major procedures performed during your hospital stay and a summary of results:  - No major procedures performed  Pending Studies (From admission, onward)    None        If studies are pending at discharge, follow up with your PCP and/or referring provider  Depression   WHAT YOU NEED TO KNOW:   Depression is a medical condition that causes feelings of sadness or hopelessness that do not go away  Depression may cause you to lose interest in things you used to enjoy  These feelings may interfere with your daily life  DISCHARGE INSTRUCTIONS:   Call 911 for any of the following:   · You think about harming yourself or someone else  Contact your healthcare provider if:   · Your symptoms do not improve  · You cannot make it to your next appointment  · You have new symptoms  · You have questions or concerns about your condition or care  Medicines:   · Antidepressants  may be given to improve or balance your mood  You may need to take this medicine for several weeks before you begin to feel better  · Take your medicine as directed  Contact your healthcare provider if you think your medicine is not helping or if you have side effects  Tell him of her if you are allergic to any medicine  Keep a list of the medicines, vitamins, and herbs you take  Include the amounts, and when and why you take them  Bring the list or the pill bottles to follow-up visits  Carry your medicine list with you in case of an emergency  Therapy  may be used to treat your depression   A therapist will help you learn to cope with your thoughts and feelings  This can be done alone or in a group  It may also be done with family members or a significant other  Self-care:   · Get regular physical activity  Try to exercise for 30 minutes, 3 to 5 days a week  Work with your healthcare provider to develop an exercise plan that you enjoy  Physical activity may improve your symptoms  · Get enough sleep  Create a routine to help you relax before bed  You can listen to music, read, or do yoga  Try to go to bed and wake up at the same time every day  Sleep is important for emotional health  · Eat a variety of healthy foods from all of the food groups  A healthy meal plan is low in fat, salt, and added sugar  Ask your healthcare provider for more information about a meal plan that is right for you  · Do not drink alcohol or use drugs  Alcohol and drugs can make your symptoms worse  Follow up with your healthcare provider as directed: Your healthcare provider will monitor your progress at follow-up visits  He or she will also monitor your medicine if you take antidepressants  Your healthcare provider will ask if the medicine is helping  Tell him or her about any side effects or problems you may have with your medicine  The type or amount of medicine may need to be changed  Write down your questions so you remember to ask them during your visits  © 2017 [employer] [employer]  Information is for End User's use only and may not be sold, redistributed or otherwise used for commercial purposes  All illustrations and images included in CareNotes® are the copyrighted property of A D A M , Inc  or Deandre Gonzales  The above information is an  only  It is not intended as medical advice for individual conditions or treatments  Talk to your doctor, nurse or pharmacist before following any medical regimen to see if it is safe and effective for you

## 2019-03-11 NOTE — TREATMENT PLAN
TREATMENT PLAN REVIEW - [address] 46 y o  1965 female MRN: [id number]    [facility]  Room / Bed: Mely Zuniga [facility] Encounter: [id number]          Admit Date/Time:  3/8/2019 12:05 PM    Treatment Team: Attending Provider: Donald Aguirre MD; Consulting Physician: Epifanio [place], MD; Physician Assistant: Bhavin Francis PA-C; Patient Care Assistant: Melvin Wilson; Patient Care Assistant: Marie Munoz; Care Manager: Demetrius Husain; Registered Nurse: Tessie Colbert RN; Patient Care Assistant: Leona Boo; : Eliseo Calhoun MS    Diagnosis: Principal Problem:    Severe episode of recurrent major depressive disorder, without psychotic features [facility])      Patient Strengths/Assets: capable of independent living, communication skills    Patient Barriers/Limitations: lack of financial means, limited family ties    Short Term Goals: decrease in depressive symptoms, decrease in suicidal thoughts    Long Term Goals: stabilization of mood    Progress Towards Goals: improving gradually    Recommended Treatment: medication management, patient medication education, group therapy, milieu therapy, continued Behavioral Health psychiatric evaluation/assessment process    Treatment Frequency: daily medication monitoring, group and milieu therapy daily, monitoring through interdisciplinary rounds, monitoring through weekly patient care conferences    Expected Discharge Date:  1 to 4 days      Discharge Plan: referral to partial hospitalization program, return to previous living arrangement    Treatment Plan Created/Updated By: Donald Aguirre MD

## 2019-03-11 NOTE — PLAN OF CARE
Problem: SELF HARM/SUICIDALITY  Goal: Will have no self-injury during hospital stay  Description  INTERVENTIONS:  - Q 15 MINUTES: Routine safety checks  - Q WAKING SHIFT & PRN: Assess risk to determine if routine checks are adequate to maintain patient safety  - Encourage patient to participate actively in care by formulating a plan to combat response to suicidal ideation, identify supports and resources  Outcome: Progressing     Problem: DEPRESSION  Goal: Will be euthymic at discharge  Description  INTERVENTIONS:  - Provide emotional support via 1:1 interaction with staff  - Encourage involvement in milieu/groups/activities  - Monitor for social isolation   Outcome: Progressing     Problem: ANXIETY  Goal: Will report anxiety at manageable levels  Description  INTERVENTIONS:  - Administer medication as ordered  - Teach and encourage coping skills  - Assess patient/family for anxiety and ability to cope   Outcome: Progressing  Goal: By discharge: Patient will verbalize 2 strategies to deal with anxiety  Description  Interventions:  - Identify any obvious source/trigger to anxiety  - Staff will assist patient in applying identified coping technique/skills  - Encourage attendance of scheduled groups and activities  Outcome: Progressing     Problem: PAIN - ADULT  Goal: Verbalizes/displays adequate comfort level or baseline comfort level  Description  Interventions:  - Encourage patient to monitor pain and request assistance  - Assess pain using appropriate pain scale  - Administer analgesics based on type and severity of pain and evaluate response  - Implement non-pharmacological measures as appropriate and evaluate response  - Consider cultural and social influences on pain and pain management  - Notify physician/advanced practitioner if interventions unsuccessful or patient reports new pain  Outcome: Progressing

## 2019-03-11 NOTE — PLAN OF CARE
Patient selective in group attendance; did not attend RT groups, stating she attended a previous group and can only tolerate limited social exposure due to social anxiety and over-sensitivity to stimulation  Has been cooperative/ appropriate in interaction

## 2019-03-11 NOTE — PROGRESS NOTES
Patient  Mood  Is  Depressed ,   Low  Energy   Noted  At  Times  poorly  Verbal  Guarded ,  Decreased   Interaction  With  Peers , controls  Intact ,  No  thoughts  Of  Self  Directed   Harm  Voiced ,   72  Hour  In effect ,  Supportive  Milieu  Continued

## 2019-03-12 ENCOUNTER — OFFICE VISIT (OUTPATIENT)
Dept: PSYCHOLOGY | Facility: CLINIC | Age: 54
End: 2019-03-12
Payer: COMMERCIAL

## 2019-03-12 VITALS
HEIGHT: 66 IN | DIASTOLIC BLOOD PRESSURE: 66 MMHG | WEIGHT: 112 LBS | SYSTOLIC BLOOD PRESSURE: 114 MMHG | BODY MASS INDEX: 18 KG/M2

## 2019-03-12 DIAGNOSIS — F33.2 SEVERE EPISODE OF RECURRENT MAJOR DEPRESSIVE DISORDER, WITHOUT PSYCHOTIC FEATURES (HCC): Primary | ICD-10-CM

## 2019-03-12 PROBLEM — F32.9 MAJOR DEPRESSION: Status: ACTIVE | Noted: 2019-03-09

## 2019-03-12 PROBLEM — F32.1 CURRENT MODERATE EPISODE OF MAJOR DEPRESSIVE DISORDER WITHOUT PRIOR EPISODE (HCC): Status: ACTIVE | Noted: 2019-03-09

## 2019-03-12 PROBLEM — F33.9 MAJOR DEPRESSION, RECURRENT, CHRONIC (HCC): Status: ACTIVE | Noted: 2019-03-09

## 2019-03-12 PROCEDURE — H0035 MH PARTIAL HOSP TX UNDER 24H: HCPCS

## 2019-03-12 NOTE — PSYCH
Subjective:     Patient ID: Esvin Guido is a 46 y o  female  [facility] Clinical Progress Notes      Specialized Services Documentation  Therapist must complete separate progress note for each specific clinical activity in which the individual participated during the day  Group Psychotherapy 900-1000   Tx Plan Objective:  11 2, Therapist:  Caprice Blas RN  Group explored motivation and tactics to experience same by setting small goals, making a plan, committing to the task,asking for help, and realizing that some days will be better than others  Individual noted that she understands her motivation is often too low however she relies on her body and herbs for help  Individual also stated she often has a plan and she doesn't give up until it's completed  Group Psychotherapy 1200-1300Tx Plan Objective: 1 1, 1 2, Therapist:  Caprice Blas RN  Group discussed failure and feelings associated with same  Explored how acceptance, decreased self esteem and fear are present when reflecting on failure  Lily Garrett stated she feels guilt and hopeless in her current situation  She feels confident she will/can overcome this recent setback and has  aplan to "rebuild her life"  She finds reflecting on her earty childhood causes her to feel defeated    She is currently working on cleansing her body of this pain with herbs and meditation    PHQ-9 Depression Screening    PHQ-9:    Frequency of the following problems over the past two weeks:       Little interest or pleasure in doing things:  1 - several days  Feeling down, depressed, or hopeless:  2 - more than half the days  Trouble falling or staying asleep, or sleeping too much:  2 - more than half the days  Feeling tired or having little energy:  2 - more than half the days  Poor appetite or overeatin - several days  Feeling bad about yourself - or that you are a failure or have let yourself or your family down:  2 - more than half the days  Trouble concentrating on things, such as reading the newspaper or watching television:  1 - several days  Moving or speaking so slowly that other people could have noticed  Or the opposite - being so fidgety or restless that you have been moving around a lot more than usual:  1 - several days  Thoughts that you would be better off dead, or of hurting yourself in some way:  1 - several days  PHQ-2 Score:  3  PHQ-9 Score:  13         Other  [facility] called and informed of Shanda's admission to program  [phone]  Awaiting call back for authorization number  Robert Miller RN    Current suicide risk : Low     [id number] Met with Sierra Lobo  Reviewed program and given on call number   She completed releases and PCP notified of admission and health care coordination form completed  Completed initial psycho-social evaluation and initial treatment goals discussed  Medications changes/added/denied? No    Treatment session number: 1    Individual Case Management Visit provided today?  Yes     [facility] follow up physician's orders: See Dr Richard Kent evaluation

## 2019-03-12 NOTE — PROGRESS NOTES
Patient was able to sleep throughout the night without issue  No acute behaviors exhibited  Patient did sleep with her earplugs as per physician's order  No PRN medication necessary during the overnight hours  Bed is in lowest position  Q7 minute safety checks in progress

## 2019-03-12 NOTE — PROGRESS NOTES
Patient was present in the milieu this evening  She did attend group session but participated minimally; she did have a snack when offered  She is pleasant and cooperative but is withdrawn to self  Patient denies pain; denies anxiety, depression, SI, HI and hallucinations  She does not have any scheduled medications this evening  No acute behaviors observed  Q7 minute safety checks in place  Will CTM

## 2019-03-12 NOTE — PSYCH
Subjective:     Patient ID: Erasmo Soliman is a 46 y o  female  [facility] Clinical Progress Notes      Specialized Services Documentation  Therapist must complete separate progress note for each specific clinical activity in which the individual participated during the day  Group Psychotherapy 10:15-11:15  Cheo Adams was excused from Psychotherapy group today due to an initial case management evaluation     Therapist:  Charlene Henriquez LCSW      Education Therapy   Time:  4847-2712  Previous goal met: No  Readiness to Learning: Receptive  Barriers to Learning: None  Learning Assessment  Time: 5050-2394  Education Completed: Wellness Tools  Teaching Method: Verbal  Shared Area of Learning: Yes   Goal Set: catch up on sleep after reporting a poor nights sleep in hospital  Tx Plan Objective: 1 1, Therapist:  Charlene Henriquez LCSW

## 2019-03-12 NOTE — NURSING NOTE
AVS  Discharge packet reviewed with patient/ no questions  Belongings, discharge packet and scripts given to patient upon d/c  Patient discharged directly to the [facility]; escorted by Transporter
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
## 2019-03-12 NOTE — PROGRESS NOTES
Patient ate breakfast reluctantly as she informed her stomach was bothering her  She refused her AM medications prior to discharge  Discharge folder reviewed with patient
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
## 2019-03-13 ENCOUNTER — OFFICE VISIT (OUTPATIENT)
Dept: PSYCHOLOGY | Facility: CLINIC | Age: 54
End: 2019-03-13
Payer: COMMERCIAL

## 2019-03-13 VITALS — SYSTOLIC BLOOD PRESSURE: 122 MMHG | DIASTOLIC BLOOD PRESSURE: 70 MMHG

## 2019-03-13 DIAGNOSIS — F33.9 MAJOR DEPRESSION, RECURRENT, CHRONIC (HCC): Primary | ICD-10-CM

## 2019-03-13 PROCEDURE — H0035 MH PARTIAL HOSP TX UNDER 24H: HCPCS

## 2019-03-13 NOTE — PSYCH
Subjective:     Patient ID: Segun Martel is a 46 y o  female  [facility] Clinical Progress Notes      Specialized Services Documentation  Therapist must complete separate progress note for each specific clinical activity in which the individual participated during the day  Group Psychotherapy 9:00-10:00  Psychotherapy group focused on coping mechanisms when feeling overwhelmed or in distress  Group discussed distraction and grounding techniques, emotional release, self love, thought challenge, and accessing your higher self  Asha Carlson presented as thought disordered, odd and bizarre sharing she was re-parenting herself when speaking about positive self talk  Shared she utilizes tapping technique and yoga as coping mechanisms  Slow progress toward goals  Continue psychotherapy group to encourage Asha Carlson to explore stressors and ways of coping  Tx Plan Objective: 1 2, Therapist:  Eduardo Altamirano LCSW    Group Psychotherapy 12:00-13:00  Psychotherapy group focused on mindfulness  Group explored practice of what mindfulness is  Asha Carlson shared she practices mindfulness  Group participated in progressive muscle relaxation  Asha Carlson appeared to complete task  Participated in peer discussion sharing she found the practice beneficial to decrease her anxiety and bring self awareness to body/present moment  Plans to incorporate the practice outside of program    Some progress noted toward goals  Continue psychotherapy group to encourage Asha Carlson to explore stressors and ways of coping       Tx Plan Objective: 1 2, Therapist:  Eduardo Altamirano LCSW

## 2019-03-13 NOTE — PSYCH
Assessment/Plan:      Diagnoses and all orders for this visit:    Severe episode of recurrent major depressive disorder, without psychotic features ([facility] [address] )          Subjective:     Patient ID: Lisa Wade is a 46 y o  female  [facility] Treatment Plan   AREAS OF NEED: Depression as evidenced by hopelessness, helplessness, isolation, insecurity and poor sleep due to lack of housing,poor finances, limited family support   Date Initiated: 3/12/19    Strengths:  Motivated for treatment educated     LONG TERM GOAL:   Date Initiated: 3/12/2019  1 0 I will identify 3 signs that my depression is lessened and I am more productive in my daily life  Target Date: 3/21/2019  Completion Date:          SHORT TERM OBJECTIVES:     Date Initiated: 3/12/2019    1 1I will identify 3 triggers to my depression and list in WRAP  Revision Date:   Target Date: 3/21/2019  Completion Date:     Date Initiated: 3/12/2019  1 2 I will identify 3 mindfulness and/or distress tolerance skills I can utilize to refocus negative or worrisome thoughts  Revision Date:   Target Date: 3/21/2019  Completion Date:     Date Initiated:   3/12/2019  1 3I will take medication as ordered and share questions and/concerns in group  Revision Date  Target Date: 3/21/2019  Completion Date:      Date Initiated: 3/12/2019  1 4 I will identify 3 ways my supports can assist in my recovery and agree to staff/support contact as indicated  Revision Date:  Target Date: 3/21/2019  Completion Date:               7 DAY REVISION:    Date Initiated:  Revision Date:   Target Date:   Completion Date:        PSYCHIATRY:  Date Initiated: 3/12/2019  Medication Management and Education       Revision Date:   The person(s) responsible for carrying out the plan is Anne-Marie Escalona MD    NURSING:   Date Initiated:3/12/2019   1 1,1 2,1 3,1 4 This RN will provide daily wellness group five days weekly to educate Lisa Wade on S/S of her diagnoses and medications used in treatment  Revision Date:  The person(s) responsible for carrying out the plan is Darien Amezquita RN    PSYCHOLOGY:   Date Initiated: 3/12/2019     1 1, 1 2, 1 4 Provide psychotherapy group 5 times per week to allow opportunity for Adalberto Hernandez  to explore stressors and ways of coping  Revision Date:   The person(s) responsible for carrying out the plan is Kenya Hernandez LCSW    CASE MANAGEMENT:   Date Initiated:3/12/2019    1 0 This  will meet with Adalberto Hernandez  3-4 times weekly to assess treatment progress, discharge planning, connection to community supports and UR as indicated  Revision Date:   The person(s) responsible for carrying out the plan is Darien Amezquita RN     TREATMENT REVIEW/COMMENTS:     DISCHARGE CRITERIA:Identify 3 signs of progress and complete relapse prevention plan  DISCHARGE PLAN: outpatient psychiatry and therapy  Estimated Length of Stay:10-15 days     Diagnosis and Treatment Plan explained to Ervin Duran relates understanding diagnosis and is agreeable to Treatment Plan       CLIENT COMMENTS / Please share your thoughts, feelings, need and/or experiences regarding your treatment plan: _____________________________________________________________________________________________________________________________________________________________________________________________________________________________________________________________________________________________________________________ Date/Time: ______________     Patient Signature: _________________________________   Date/Time: ______________      Signature: _________________________________   Date/Time: ______________
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
## 2019-03-14 ENCOUNTER — OFFICE VISIT (OUTPATIENT)
Dept: PSYCHOLOGY | Facility: CLINIC | Age: 54
End: 2019-03-14
Payer: COMMERCIAL

## 2019-03-14 VITALS — DIASTOLIC BLOOD PRESSURE: 70 MMHG | SYSTOLIC BLOOD PRESSURE: 122 MMHG

## 2019-03-14 DIAGNOSIS — F33.9 MAJOR DEPRESSION, RECURRENT, CHRONIC (HCC): Primary | ICD-10-CM

## 2019-03-14 PROCEDURE — H0035 MH PARTIAL HOSP TX UNDER 24H: HCPCS

## 2019-03-14 NOTE — PSYCH
Subjective:     Patient ID: Humberto Cruz is a 46 y o  female  [facility] Clinical Progress Notes      Specialized Services Documentation  Therapist must complete separate progress note for each specific clinical activity in which the individual participated during the day  Group Psychotherapy [id number] Tx Plan Objective: 1 2,1 4, Therapist:  Omar Peralta RN  Wellness group explored coping skills with focus on methods having both costs and benefits i e  Isolation, projection, and suppression  Jh North had good eye contact and shared often with group  She stated she currently has been isolating and she plans to cope better by calling one friend daily  She stated she is trying hard to reach out to friends  Will continue to offer group to explore challenges with coping  Group Psychotherapy [id number] Tx Plan Objective: 1 2,1 4, Therapist:  Oamr Peralta RN  Group focused on identifying 2-3 things they would like to get rid of I e excess baggage  Deloris Brock was very verbal in group and explained her method of self-healing  Stated she is very aware of her body and understands when she has a low time she can make herself come back stronger  Deloris Brock continues to utilize holistic methods to "heal herself"  Encouraged to journal her feelings so she may be able to track her wellness  Will continue to offer group to explore challenges of recovery  Case Management Note    Omar Peralta RN  [id number]  Met with Deloris Brock to assess mental status and review treatment plan  Copy of same given to individual Verbalizes understanding same  Stated feeling "good" today   She has to meet with her case  manager tomorrow and is looking forward to same  Deloris Brock is putting a plan in place to extend her current housing arrangement as she applied for HUD and homeowner is willing to take her HUD check as rent  Made some progress towards her goals in feeling less hopeless and reaching out to friends       Current suicide risk : Low         Medications changes/added/denied? No    Treatment session number: 3    Individual Case Management Visit provided today?  Yes     [facility] follow up physician's orders: See Dr Jazmyne Giles evaluation

## 2019-03-14 NOTE — PSYCH
Subjective:     Patient ID: Kim Santana is a 46 y o  female  [facility] Clinical Progress Notes      Specialized Services Documentation  Therapist must complete separate progress note for each specific clinical activity in which the individual participated during the day  Group Psychotherapy 10:15-11:15  Psychotherapy group focused on the importance of a strong foundation for recovery  Group participated in activity that explored each individuals own foundation  Gely Felder participated in activity and peer discussion which included acceptance, positive support, mindfulness and distress tolerance skills, goal setting, spirituality, and values  Gely Felder identified positive support, positive self talk, and acceptance as parts of her foundation  Some progress noted toward goals  Continue psychotherapy group to encourage Gely Felder to explore stressors and ways of coping     Tx Plan Objective: 1 2, 1 4, Therapist:  Lorrie Mejia LCSW    Education Therapy   Time:  4253-134  Previous goal met: Yes   Readiness to Learning: Receptive  Barriers to Learning: None  Learning Assessment  Time: 8732-4554  Education Completed: Wellness Tools  Teaching Method: Verbal and Demonstration  Shared Area of Learning: Yes   Goal Set: go to chiropractor as form of self care  Tx Plan Objective: 1 2, Therapist:  Lorrie Mejia LCSW

## 2019-03-15 ENCOUNTER — DOCUMENTATION (OUTPATIENT)
Dept: PSYCHOLOGY | Facility: CLINIC | Age: 54
End: 2019-03-15

## 2019-03-15 ENCOUNTER — APPOINTMENT (OUTPATIENT)
Dept: PSYCHOLOGY | Facility: CLINIC | Age: 54
End: 2019-03-15
Payer: COMMERCIAL

## 2019-03-15 NOTE — PROGRESS NOTES
Prakash Points scheduled absence due to prior commitment  She has program number and [facility] crisis number if needed will be at program on Monday March 18th 2019

## 2019-03-18 ENCOUNTER — APPOINTMENT (OUTPATIENT)
Dept: PSYCHOLOGY | Facility: CLINIC | Age: 54
End: 2019-03-18
Payer: COMMERCIAL

## 2019-03-18 ENCOUNTER — DOCUMENTATION (OUTPATIENT)
Dept: PSYCHOLOGY | Facility: CLINIC | Age: 54
End: 2019-03-18

## 2019-03-18 NOTE — PROGRESS NOTES
Subjective:     Patient ID: Girish Calvo is a 46 y o  female  [facility] Clinical Progress Notes      Specialized Services Documentation  Therapist must complete separate progress note for each specific clinical activity in which the individual participated during the day  Case Management Note    Ambar Knolwes MT-BC    Current suicide risk : Low     5096 Girish Calvo left message with staff that she is in [place] and will be staying there over the next 2 days due to community support there  Medications changes/added/denied? No    Treatment session number: na    Individual Case Management Visit provided today?  No    [facility] follow up physician's orders: na

## 2019-03-19 ENCOUNTER — DOCUMENTATION (OUTPATIENT)
Dept: PSYCHOLOGY | Facility: CLINIC | Age: 54
End: 2019-03-19

## 2019-03-19 ENCOUNTER — APPOINTMENT (OUTPATIENT)
Dept: PSYCHOLOGY | Facility: CLINIC | Age: 54
End: 2019-03-19
Payer: COMMERCIAL

## 2019-03-20 ENCOUNTER — OFFICE VISIT (OUTPATIENT)
Dept: PSYCHOLOGY | Facility: CLINIC | Age: 54
End: 2019-03-20
Payer: COMMERCIAL

## 2019-03-20 DIAGNOSIS — F33.9 MAJOR DEPRESSION, RECURRENT, CHRONIC (HCC): Primary | ICD-10-CM

## 2019-03-20 PROCEDURE — H0035 MH PARTIAL HOSP TX UNDER 24H: HCPCS

## 2019-03-20 NOTE — PSYCH
Subjective:     Patient ID: Valene Primrose is a 46 y o  female  [facility] Clinical Progress Notes      Specialized Services Documentation  Therapist must complete separate progress note for each specific clinical activity in which the individual participated during the day  Other:  [address] at [facility] and left message requesting a return phone call to discuss her care  14:30 Contacted Shanda's friend, Robert Torres [phone]  Daya Morales will be moving in with Bhavna Parham in April in [facility]  She confirmed Daya Morales has been on the waiting list for [facility] for [employer]  She shared that she is unsure if Daya Morales plans to stay in the area  She has a history of staying at the [facility] in [place] and is considering returning  She said Daya Morales enjoys being around supportive people, being in nature, walking a lot, and spirituality  Bhavna Parham expressed no concerns  History of mouse infestation was brought up and Bhavna Parham said that was prior to them becoming friends 3-4 years ago  Case Management Note    Manjula Scott LCSW    Current suicide risk : Low     6536-2902 Met with Shanda Morales shared she was with friends in [place] for the past several days and enjoyed meeting other supports  She is reporting a decrease in her depression  Appears to be thought disordered at times, yemizawayne Keating LCSW discussed speaking to a friend or family member for donna Morales signed release for her friend, Robert Morales will be obtaining the phone number and providing to this   Medications changes/added/denied? No    Treatment session number: 4    Individual Case Management Visit provided today?  Yes     [facility] follow up physician's orders: see Dr Waqas Martinez evaluation

## 2019-03-20 NOTE — PSYCH
Subjective:     Patient ID: Segun Martel is a 46 y o  female  [facility] Clinical Progress Notes      Specialized Services Documentation  Therapist must complete separate progress note for each specific clinical activity in which the individual participated during the day  Group Psychotherapy (7412-1793)    Psychotherapy group focused on grounding techniques to help control flashbacks, anxiety and uncomfortable symptoms by turning attention away from thoughts, memories and refocusing on the present moment  Asha Carlson appeared to engage and participated in group discussion  Group activities using 5-4-3-2-1 technique, choosing categories and spend time coming up with as many as possible, body awareness, and mental illness techniques  Moderate progress observed toward goal   Continue psychotherapy group to encourage Asha Carlson to explore stressors and coping        Tx Plan Objective: 1 2, Therapist:  Suanne Leyden, BS    Education Therapy   Time:  3428-1395  Previous goal met: No  Readiness to Learning: Receptive  Barriers to Learning: None  Learning Assessment  Time: 1  Education Completed: Wellness Tools  Teaching Method: Verbal  Shared Area of Learning: Yes   Goal Set: connect with a friend who is a realtor to discuss options to move back to [place] [address] with her housing voucher transfer  Tx Plan Objective: 1 2, Therapist:  Suanne Leyden, BS

## 2019-03-20 NOTE — PSYCH
Subjective:     Patient ID: Alverto Willis is a 46 y o  female  [facility] Clinical Progress Notes      Specialized Services Documentation  Therapist must complete separate progress note for each specific clinical activity in which the individual participated during the day  Group Psychotherapy [id number]  Alverto Willis excused from wellness group due to late arrival    , Therapist:  Raine Ferro RN     Group Psychotherapy [id number]  Nupur Servin participated in wellness group focused on journaling and how it affects your mental health  Group explored different ways to journal   Nupur Servin was attentive and did participate in peer discussion  Nupur Servin recognizes how journaling is beneficial to her  Shared experience with how it helped process her feelings with a friend  She is making  progress towards goals  Continue to offer wellness group to explore coping skills to benefit wellness      Tx Plan Objective: 1 1,1 4, Therapist:  Raine Ferro RN

## 2019-03-21 ENCOUNTER — OFFICE VISIT (OUTPATIENT)
Dept: PSYCHOLOGY | Facility: CLINIC | Age: 54
End: 2019-03-21
Payer: COMMERCIAL

## 2019-03-21 DIAGNOSIS — F33.9 MAJOR DEPRESSION, RECURRENT, CHRONIC (HCC): Primary | ICD-10-CM

## 2019-03-21 PROCEDURE — H0035 MH PARTIAL HOSP TX UNDER 24H: HCPCS

## 2019-03-21 NOTE — PSYCH
Subjective:     Patient ID: David Hidalgo is a 46 y o  female  [facility] Clinical Progress Notes      Specialized Services Documentation  Therapist must complete separate progress note for each specific clinical activity in which the individual participated during the day  Group Psychotherapy [id number]  Wellness group focused on ability to better deal with triggers that contribute to current stressors and presenting symptoms  Cherelle Julien was an active group participant, not only openly self-disclosing, but offering empathetic and insightful input to others  She reported increased ability to better handle 'psych-based' issues, stress and real- life struggles with increased confidence and improved outcomes  She was also able to identify supports to whom she has reached out  Continue Wellness groups for improved ability to develop alternate coping skills  Tx Plan Objective: 1 1, 1 2, Therapist:  Patricia Pompa RN        Group Psychotherapy  [id number]  Life Skills group focused on improved ability to develop and foster more positive thought patterns, reducing the effect of negativity in life event outcomes  Offered insightful support to peers who were struggling with excessive negativity in their lives  Disclosed her own successful progression through the uncomfortable and painful process of setting boundaries with loved ones who promoted negative circumstances and triggered her depression/anxiety  Continue Life Skills groups for ongoing development of improved positive focus skills    Tx Plan Objective: 1 1, 1 2 , Therapist: Patricia Pompa RN
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
## 2019-03-21 NOTE — PSYCH
Assessment/Plan:      Diagnoses and all orders for this visit:    Major depression, recurrent, chronic (HCC)          Subjective:     Patient ID: Alverto Willis is a 46 y o  female  [facility] Treatment Plan   AREAS OF NEED: Depression as evidenced by hopelessness, helplessness, isolation, insecurity and poor sleep due to lack of housing,poor finances, limited family support   Date Initiated: 3/12/19    Strengths:  Motivated for treatment educated     LONG TERM GOAL:   Date Initiated: 3/12/2019  1 0 I will identify 3 signs that my depression is lessened and I am more productive in my daily life  Target Date: 3/21/2019  Completion Date:          SHORT TERM OBJECTIVES:     Date Initiated: 3/12/2019    1 1I will identify 3 triggers to my depression and list in WRAP  Revision Date:   Target Date: 3/21/2019  Completion Date: 3/21/19    Date Initiated: 3/12/2019  1 2 I will identify 3 mindfulness and/or distress tolerance skills I can utilize to refocus negative or worrisome thoughts  Revision Date:   Target Date: 3/21/2019  Completion Date: 3/21/19    Date Initiated:   3/12/2019  1 3I will take medication as ordered and share questions and/concerns in group  Revision Date  Target Date: 3/21/2019  Completion Date:      Date Initiated: 3/12/2019  1 4 I will identify 3 ways my supports can assist in my recovery and agree to staff/support contact as indicated  Revision Date:  Target Date: 3/21/2019  Completion Date:           7 DAY REVISION:    Date Initiated: 3/21/19  1 5  I will consistently address hopelessness through one of five pathways daily- doing normal things, connecting with another, listing and working on short term goals, nurturing self, and/or practicing a form of spirituality    Revision Date:   Target Date: 4/1/19  Completion Date:      PSYCHIATRY:  Date Initiated: 3/12/19  Medication Management and Education       Revision Date: 3/21/19       Continue medication management  The person(s) responsible for carrying out the plan is James Chappell MD    NURSING:   Date Initiated: 3/12/19  1 1,1 2,1 3,1 4 This RN will provide daily wellness group five days weekly to educate Esvin Guido on S/S of her diagnoses and medications used in treatment  Revision Date: 3/21/19  1 1,1 2,1 3,1 4,1 5 Continue to encourage Esvin Guido to participate in wellness groups daily to learn about symptoms, coping strategies and warning signs to promote relapse prevention  The person(s) responsible for carrying out the plan is Caprice Blas RN    PSYCHOLOGY:   Date Initiated: 3/12/19       1 1, 1 2, 1 4 Provide psychotherapy group 5 times per week to allow opportunity for Esvin Guido to explore stressors and ways of coping  Revision Date: 3/21/19  1 1,1 2,1 4,1 5 Continue to provide psychotherapy group daily to Esvin Guido and encourage sharing of stressors, skills and positive change  The person(s) responsible for carrying out the plan is Karen Menjivar LCSW            CASE MANAGEMENT:   Date Initiated: 3/12/19      1 0 This  will meet with Esvin Guido 3-4 times weekly to assess treatment progress, discharge planning, connection to community supports and UR as indicated  Revision Date: 3/21/19  1 0 Continue to meet with Esvin Guido 3-4 times weekly to assess growth, work toward goals, continued treatment needs, dc planning and use of supports  The person(s) responsible for carrying out the plan is Manjula Scott LCSW      TREATMENT REVIEW/COMMENTS:     DISCHARGE CRITERIA:Identify 3 signs of progress and complete relapse prevention plan  DISCHARGE PLAN: outpatient psychiatry and therapy  Estimated Length of Stay:10-15 days     Diagnosis and Treatment Plan explained to Kristie Field relates understanding diagnosis and is agreeable to Treatment Plan       CLIENT COMMENTS / Please share your thoughts, feelings, need and/or experiences regarding your treatment plan: _____________________________________________________________________________________________________________________________________________________________________________________________________________________________________________________________________________________________________________________ Date/Time: ______________     Patient Signature: _________________________________   Date/Time: ______________      Signature: _________________________________   Date/Time: ______________

## 2019-03-22 ENCOUNTER — OFFICE VISIT (OUTPATIENT)
Dept: PSYCHOLOGY | Facility: CLINIC | Age: 54
End: 2019-03-22
Payer: COMMERCIAL

## 2019-03-22 DIAGNOSIS — F33.9 MAJOR DEPRESSION, RECURRENT, CHRONIC (HCC): Primary | ICD-10-CM

## 2019-03-22 PROBLEM — F41.9 ANXIETY: Status: ACTIVE | Noted: 2019-03-22

## 2019-03-22 PROCEDURE — H0035 MH PARTIAL HOSP TX UNDER 24H: HCPCS

## 2019-03-22 NOTE — PSYCH
[facility] Discharge Instructions for 3/25/19:   Disposition: home  Address: [address] [place] [address] [address] [place] [address]  Diagnosis: Major Depressive Disorder   Allergies (Drug/Food): Allergies   Allergen Reactions    Dairy Aid [Lactase]     Nickel     Other      [place]    Wheat Bran      Patient gets "puffy"     Activity: no changes in activity  Diet:no recommendations  Smoking Cessation:not a smoker   Diagnostic/Laboratory Orders: none ordered  Vaccines: If you received a vaccine, please notify your family physician on your next visit  For more information, please call [phone]  Follow-up appointments/Referrals:     [facility]  [address] [place] [address]  Phone: [phone]  Fax: [phone]  Appointments: 4/2/19 at 10:00AM and 4/8/19 at 11:00AM  Declining appointment with psychiatrist at [facility] were provided information on the following psychiatric rehabilitation centers:  [facility]  [address] [place] [address] Phone: [phone]  [address] Phone: [phone]  You did not request a referral be made during your admission  If you change your mind, please contact the program directly or speak to your  to place the referral      PCP-  Felicia Mccabe Zena rosario, [address]  Phone: [phone], You have declined to have discharge summary faxed  Appointment to be determined by patient      ICM/CTT:     [facility]   [address]  Phone: [phone]  Fax: [phone]  Ophelia Quigley will be calling you within 48 hours to schedule an appointment  Utilize WRAP, support groups, keep appointments, and take medications as prescribed  [facility] SPECIAL[facility] hospitals - [facility]: [phone]    [address] [place] [address] [phone]  National Crisis Intervention Hotline: 3-391.807.2072    [place] Suicide Crisis Hotline: 3-765-387-473-109-4298  I, the undersigned, have received and understand the above instructions          Patient/Rep Signature: __________________________________     Date/Time: ______________       Physician Signature: ____________________________________    Date/Time: ______________             Signature: ________________________________     Date/Time: ______________

## 2019-03-22 NOTE — PSYCH
Subjective:     Patient ID: Christian Joshi is a 46 y o  female  [facility] Clinical Progress Notes      Specialized Services Documentation  Therapist must complete separate progress note for each specific clinical activity in which the individual participated during the day  Case Management Note    Manjula Scott LCSW    Current suicide risk : Low     [id number] Met with Louise  She was able to schedule aftercare appointments at [facility]    She scheduled 2 appointments with her therapist [place]  Continues to decline medications and therefore refuses appointment with psychiatrist at [facility]    Relapse prevention plan provided  Discharge for 3/25/19 reviewed  Medications changes/added/denied? No    Treatment session number: 6    Individual Case Management Visit provided today?  Yes     [facility] follow up physician's orders: see Dr Daniela Muller evaluation

## 2019-03-22 NOTE — PSYCH
Subjective:     Patient ID: Adalberto Hernandez is a 46 y o  female  [facility] Clinical Progress Notes      Specialized Services Documentation  Therapist must complete separate progress note for each specific clinical activity in which the individual participated during the day  Group Psychotherapy ([id number])  Psychotherapy group discussed Healthy Boundaries and setting personal boundaries  Group discussed setting personal limits and rules that we set for ourselves within relationships  Group talked about healthy ways of saying NO  Daiva Goldmann engaged in activities on how healthy Boundaries help us to identify setting our personal limits, listening to our emotions, knowing values, self respect and respect for others  how to be assertive in a respectful way  Daiva Goldmann shared how she had a break through inrealizing that she had expectations of others that were unrealistic, and changing her mindset has opened her up  Moderate progress observed toward goal   Continue psychotherapy group to encourage Dilip Ma to explore stressors and coping  Tx Plan Objective: 1 2, Therapist:  NATALIA Gillis    Group Psychotherapy ([id number])  Psychotherapy group focused on values  Daiva Goldmann appeared to engage and took part in exercises about personal strengths, qualities and values  Group explored how your personal values and beliefs defines what is most important to you  Denies discussed her how her charles is her #1 value and how keeping it a priority keeps her moving in a forward direction  How they guide each of your life choices  Group discussed ways we can recognize areas of our life that need more attention and ways to prioritize  Moderate progress observed toward goal   Continue psychotherapy group to encourage Dilip Ma to explore stressors and coping      Tx Plan Objective: 1 2, Therapist:  Mally Pinedo

## 2019-03-22 NOTE — PSYCH
[facility] follow up physician's orders:   Subjective:     Patient ID: Frances Cartwright is a 46 y o  female  [facility] Clinical Progress Notes      Specialized Services Documentation  Therapist must complete separate progress note for each specific clinical activity in which the individual participated during the day  Group Psychotherapy [id number]     Life Skills group focused on improved ability to develop and foster more positive thought patterns, reducing the effect of negativity in life event outcomes  Zac Yeh participated well, sharing plans for upcoming discharge with the group  Feels stable and ready to go  Will be staying with a friend in [facility] for a brief period  Plans to return to [facility] for OP treatment  Shared experiences of a positive nature and supported others with their concerns  Continue Life Skills groups for ongoing development of improved positive focus skills    Tx Plan Objective: 1 1, 1 2 , Therapist: Raul Irene RN     Education Therapy   Time:  0306-069  Previous goal met: Yes   Readiness to Learning: Receptive  Barriers to Learning: None  Learning Assessment  Time: 8347-6293  Education Completed: Wellness Tools  Teaching Method: Verbal  Shared Area of Learning: Yes   Goal Set: look up community supports  Tx Plan Objective: 1 2, 1 5 Therapist:  Raul Irene RN

## 2019-03-25 ENCOUNTER — OFFICE VISIT (OUTPATIENT)
Dept: PSYCHOLOGY | Facility: CLINIC | Age: 54
End: 2019-03-25
Payer: COMMERCIAL

## 2019-03-25 DIAGNOSIS — F33.9 MAJOR DEPRESSION, RECURRENT, CHRONIC (HCC): Primary | ICD-10-CM

## 2019-03-25 PROCEDURE — H0035 MH PARTIAL HOSP TX UNDER 24H: HCPCS

## 2019-03-25 NOTE — PSYCH
Subjective:     Patient ID: Apollo Servin is a 46 y o  female  [facility] Clinical Progress Notes      Specialized Services Documentation  Therapist must complete separate progress note for each specific clinical activity in which the individual participated during the day  Group Psychotherapy [phone] Sabrina Youssef actively shared in psychotherapy group focused on mindfulness  She participated in relaxation exercises, describing different components of music heard, and in song recreation and enrique analysis  Group explored breath counting and progressive muscle relaxation  Group discussed the "what" skills of DBT mindfulness, including observe, describe, and participate  She was given hand-outs on topic  Sabrina Youssef shared that she will continue to practice mindfulness after discharge by practicing relaxation techniques, observing more often, and setting out reminders to do so  Good progress towards goal observed and shared  Discharge at end of treatment day  Tx Plan Objective: 1 2, 1 5 Therapist:  Jason BENOIT    Group Psychotherapy [id number] Sabrina Youssef actively shared in psychotherapy group focused on coping skills  She participated in discussion and coping skill naming exercise  Group discussed different categories of coping skills, and when it is appropriate to use them  Group also discussed healthy versus unhealthy coping skills  She was given hand-out on topic  Sabrina Youssef shared that she uses mindfulness, unity, and nature as coping skills currently, and she plans on making a list of her different coping skills to continue developing after discharge  Good progress towards goal observed and shared  Discharge at end of treatment day   Tx Plan Objective: 1 1, 1 2, 1 5 Therapist:  Jason BENOIT

## 2019-03-25 NOTE — PSYCH
Subjective:     Patient ID: Christian Joshi is a 46 y o  female  [facility] Discharge Summary:   Admission Date: 3/12/19  Patient was referred by [facility]  Discharge Date: 3/25/19  Was this a routine discharge? yes     Diagnosis: Axis I: Major [facility] recurrent[facility]    Treating Physician: Dr Irena Thacker  Treatment Complications: Refuses medication    Presenting Need:   Per Dr Osborn Reasoner:  Christian Joshi is a 46 y o  female with past psychiatric history of untreated mood disorder and recent dx of MMD is admitted to [address] [place] [address] referred by [facility] for ongoing need of psychiatric treatment as step down from inpatient admission  Pt is a somewhat poor historian, guarded and vague regarding some history  She reports presenting to [facility] ED on 3/8 with overwhelming depression and anxiety with thoughts of self harm  She felt improved and was discharged on 3/11  Pt states she has history of feeling depressed but has not been amenable to taking any psychiatric medications  Pt reports she is still sad but no longer having any thoughts of self harm  She has intermittent sleel difficulty, poor appetite, feels lonely and isolated  She states she has no support system though she has a sister who lives close by but is refusing to allow any contact with her for collateral information  She reports she moved to the area from [facility] 2 years ago when her parent passed and was initially living in there home  She has been unemployed  She is vague and gives a rather disconnected story when pressed for details remains circumstantial  No overt psychosis but is slightly illogical in history, denies AH/VH  No evidence of sharon or agitation  Pt reports she does not want to take psychiatric medications  Psychosocial Stressors include lack of finances/employment, poor support system       Course of treatment includes:    group counseling, medication management, individual case management, psychoeducation, psychiatric evaluation, individual therapy  and contact with outpatient therapist, [facility], and friend Shannan Benitez  Treatment Progress: Apollo Servin attended 7 PHP days in which objectives focused on identifying and using personal strengths, activating wellness tools, and creating structure  She initially had difficulty focusing on self during treatment which she identified as a defense mechanism  Through group she was able to explore triggers, coping skills, and community resources to benefit her wellness  She completed assignments in and out of program to support awareness of strengths and needs  Upon discharge, she reported increased personal awareness, more consistent structure and routine, and future goal oriented  She reports no longer having feelings of hopelessness and despair and is using her supports in the community  She denies SI/HI, and psychosis  She denied issues with medications  Aftercare recommendations include:   Cathy Luna   with [place]- [phone]- 4/2/19 at 10:00AM and 4/8/19 and 11:00AM  [facility]- declined referral to psychiatrist  [facility]- [phone]- will call within 48 hours of discharge  Providing information on psychiatric rehabilitation centers: [address] [place] [address]  Declined referral during admission      Utilize WRAP, supports groups, keep appointments, take medications as prescribed    Discharge Medications include:  Current Outpatient Medications:     cholecalciferol 1000 units tablet, Take 1 tablet (1,000 Units total) by mouth daily, Disp: 30 tablet, Rfl: 0    cyanocobalamin 1000 MCG tablet, Take 1 tablet (1,000 mcg total) by mouth daily, Disp: 30 tablet, Rfl: 0

## 2019-03-25 NOTE — PSYCH
Subjective:     Patient ID: Lorena Tobar is a 46 y o  female  [facility]ations Clinical Progress Notes      Specialized Services Documentation  Therapist must complete separate progress note for each specific clinical activity in which the individual participated during the day  Group Psychotherapy [phone]  Wellness group focused on ability to better deal with triggers that contribute to current stressors and presenting symptoms, particularly related to anxiety  Nicolas Sandoval was very involved in the discussion and group process, sharing the physical areas where she personally experiences anxiety as mainfested in muscle tension/stiffness, as well as multiple methods of anxiety reduction that have been personally effective  Nicolas Sandoval continues to display interest in and compassion toward group members  She shared her overall feelings of appreciation and benefit received while a group participant and stated that she would miss the group, as this was her last day of attendance  Nicolas Sandoval felt she had accomplished goals set during her program attendance  Tx Plan Objective: 1 2, 1 4, 1 5 Therapist:  Raul Bowling RN       Education Therapy   Time:  4127-3653  Previous goal met: Yes   Readiness to Learning: Receptive  Barriers to Learning: None  Learning Assessment  Time: 0144-3198  Education Completed: Wellness Tools  Teaching Method: Verbal  Shared Area of Learning: Yes   Goal Set: Focus on health, with specific plan to cook a healthy supper  Tx Plan Objective: 1 1, 1 5, Therapist:  Raul Bowling RN

## 2019-03-25 NOTE — PSYCH
Other   1320- Phone call placed to Adore Gusman at [facility] to complete discharge review  Awaiting return phone call [phone]- Discharge review completed with Adore Gusman at [place] [address]  Case Management Note    SHIRA CombsW    Current suicide risk : Low     [id number]   Met with Shanda  Reviewed readiness for discharge  She completed relapse prevention plan, aftercare plan, and medication list    She shared she is feeling less depressed as evidenced by no longer having feelings of hopelessness or despair  She also reports feeling supported  OP providers to receive discharge summary  She denied SI/HI and psychosis  PHQ-9 Depression Screening    PHQ-9:    Frequency of the following problems over the past two weeks:       Little interest or pleasure in doing things:  0 - not at all  Feeling down, depressed, or hopeless:  0 - not at all  Trouble falling or staying asleep, or sleeping too much:  1 - several days  Feeling tired or having little energy:  1 - several days  Poor appetite or overeatin - not at all  Feeling bad about yourself - or that you are a failure or have let yourself or your family down:  0 - not at all  Trouble concentrating on things, such as reading the newspaper or watching television:  0 - not at all  Moving or speaking so slowly that other people could have noticed  Or the opposite - being so fidgety or restless that you have been moving around a lot more than usual:  0 - not at all  Thoughts that you would be better off dead, or of hurting yourself in some way:  0 - not at all  PHQ-2 Score:  0         Medications changes/added/denied? No    Treatment session number: 7    Individual Case Management Visit provided today?  Yes     In[facility]ations follow up physician's orders: see discharge order

## 2019-03-25 NOTE — PSYCH
Assessment/Plan:       Diagnoses and all orders for this visit:     Major depression, recurrent, chronic (HCC)            Subjective:      Patient ID: Jessica Stevens is a 46 y o  female      [facility]ations Treatment Plan   AREAS OF NEED: Depression as evidenced by hopelessness, helplessness, isolation, insecurity and poor sleep due to lack of housing,poor finances, limited family support   Date Initiated: 3/12/19     Strengths:  Motivated for treatment educated            LONG TERM GOAL:   Date Initiated: 3/12/2019  1 0 I will identify 3 signs that my depression is lessened and I am more productive in my daily life  Target Date: 4/10/19  Completion Date: 3/25/19           SHORT TERM OBJECTIVES:      Date Initiated: 3/12/2019    1 1I will identify 3 triggers to my depression and list in WRAP  Revision Date:   Target Date: 3/21/2019  Completion Date: 3/21/19     Date Initiated: 3/12/2019  1 2 I will identify 3 mindfulness and/or distress tolerance skills I can utilize to refocus negative or worrisome thoughts  Revision Date:   Target Date: 3/21/2019  Completion Date: 3/21/19     Date Initiated: 3/12/2019  1 3I will take medication as ordered and share questions and/concerns in group  Revision Date  Target Date: 3/21/2019  Completion Date:  3/25/19     Date Initiated: 3/12/2019  1 4 I will identify 3 ways my supports can assist in my recovery and agree to staff/support contact as indicated  Revision Date:  Target Date: 3/21/2019  Completion Date: 3/25/19             7 DAY REVISION:     Date Initiated: 3/21/19  1 5  I will consistently address hopelessness through one of five pathways daily- doing normal things, connecting with another, listing and working on short term goals, nurturing self, and/or practicing a form of spirituality    Revision Date:   Target Date: 4/1/19  Completion Date: 3/25/19        PSYCHIATRY:  Date Initiated: 3/12/19  Medication Management and Education       Revision Date: 3/21/19       Continue medication management  The person(s) responsible for carrying out the plan is Carli Fontenot MD     NURSING:   Date Initiated: 3/12/19  1 1,1 2,1 3,1 4 This RN will provide daily wellness group five days weekly to educate Janes Sexton on S/S of her diagnoses and medications used in treatment  Revision Date: 3/21/19  1 1,1 2,1 3,1 4,1 5 Continue to encourage Janes Sexton to participate in wellness groups daily to learn about symptoms, coping strategies and warning signs to promote relapse prevention  The person(s) responsible for carrying out the plan is Claude Cortez RN     PSYCHOLOGY:   Date Initiated: 3/12/19       1 1, 1 2, 1 4 Provide psychotherapy group 5 times per week to allow opportunity for Janes Sexton to explore stressors and ways of coping  Revision Date: 3/21/19  1 1,1 2,1 4,1 5 Continue to provide psychotherapy group daily to Janes Setxon and encourage sharing of stressors, skills and positive change  The person(s) responsible for carrying out the plan is Annmarie Carmona LCSW             CASE MANAGEMENT:   Date Initiated: 3/12/19      1 0 This  will meet with Janes Sexton 3-4 times weekly to assess treatment progress, discharge planning, connection to community supports and UR as indicated  Revision Date: 3/21/19  1 0 Continue to meet with Janes Sexton 3-4 times weekly to assess growth, work toward goals, continued treatment needs, dc planning and use of supports    The person(s) responsible for carrying out the plan is Annmarie Carmona LCSW        TREATMENT REVIEW/COMMENTS:      DISCHARGE CRITERIA:Identify 3 signs of progress and complete relapse prevention plan  DISCHARGE PLAN: outpatient psychiatry and therapy  Estimated Length of Stay:10-15 days      Diagnosis and Treatment Plan explained to Hieu Dawn relates understanding diagnosis and is agreeable to Treatment Plan       CLIENT COMMENTS / Please share your thoughts, feelings, need and/or experiences regarding your treatment plan: _____________________________________________________________________________________________________________________________________________________________________________________________________________________________________________________________________________________________________________________ Date/Time: ______________      Patient Signature: _________________________________   Date/Time: ______________       Signature: _________________________________   Date/Time: ______________

## 2019-03-26 ENCOUNTER — APPOINTMENT (OUTPATIENT)
Dept: PSYCHOLOGY | Facility: CLINIC | Age: 54
End: 2019-03-26
Payer: COMMERCIAL

## 2019-03-27 ENCOUNTER — APPOINTMENT (OUTPATIENT)
Dept: PSYCHOLOGY | Facility: CLINIC | Age: 54
End: 2019-03-27
Payer: COMMERCIAL

## 2019-03-28 ENCOUNTER — APPOINTMENT (OUTPATIENT)
Dept: PSYCHOLOGY | Facility: CLINIC | Age: 54
End: 2019-03-28
Payer: COMMERCIAL

## 2019-03-29 ENCOUNTER — APPOINTMENT (OUTPATIENT)
Dept: PSYCHOLOGY | Facility: CLINIC | Age: 54
End: 2019-03-29
Payer: COMMERCIAL

## 2019-07-27 ENCOUNTER — OFFICE VISIT (OUTPATIENT)
Dept: URGENT CARE | Facility: CLINIC | Age: 54
End: 2019-07-27
Payer: COMMERCIAL

## 2019-07-27 VITALS
WEIGHT: 112 LBS | OXYGEN SATURATION: 99 % | DIASTOLIC BLOOD PRESSURE: 74 MMHG | TEMPERATURE: 98 F | HEIGHT: 66 IN | SYSTOLIC BLOOD PRESSURE: 108 MMHG | HEART RATE: 81 BPM | RESPIRATION RATE: 16 BRPM | BODY MASS INDEX: 18 KG/M2

## 2019-07-27 DIAGNOSIS — J01.00 ACUTE MAXILLARY SINUSITIS, RECURRENCE NOT SPECIFIED: Primary | ICD-10-CM

## 2019-07-27 DIAGNOSIS — R04.0 EPISTAXIS: ICD-10-CM

## 2019-07-27 DIAGNOSIS — S46.912A LEFT SHOULDER STRAIN, INITIAL ENCOUNTER: ICD-10-CM

## 2019-07-27 PROCEDURE — 99203 OFFICE O/P NEW LOW 30 MIN: CPT | Performed by: PHYSICIAN ASSISTANT

## 2019-07-27 PROCEDURE — G0382 LEV 3 HOSP TYPE B ED VISIT: HCPCS | Performed by: PHYSICIAN ASSISTANT

## 2019-07-27 PROCEDURE — 99283 EMERGENCY DEPT VISIT LOW MDM: CPT | Performed by: PHYSICIAN ASSISTANT

## 2019-07-27 RX ORDER — AMOXICILLIN 875 MG/1
875 TABLET, COATED ORAL 2 TIMES DAILY
Qty: 20 TABLET | Refills: 0 | Status: SHIPPED | OUTPATIENT
Start: 2019-07-27 | End: 2019-08-06

## 2019-07-27 RX ORDER — PREDNISONE 10 MG/1
TABLET ORAL
Qty: 26 TABLET | Refills: 0 | Status: SHIPPED | OUTPATIENT
Start: 2019-07-27

## 2019-07-27 RX ORDER — SACCHAROMYCES BOULARDII 250 MG
250 CAPSULE ORAL 2 TIMES DAILY
Qty: 60 CAPSULE | Refills: 0 | Status: SHIPPED | OUTPATIENT
Start: 2019-07-27 | End: 2019-08-26

## 2019-07-27 NOTE — PROGRESS NOTES
[facility]    NAME: Isreal Singer is a 46 y o  female  : 1965    MRN: [id number]  DATE: 2019  TIME: 5:07 PM    Assessment and Plan   Acute maxillary sinusitis, recurrence not specified [J01 00]  1  Acute maxillary sinusitis, recurrence not specified  amoxicillin (AMOXIL) 875 mg tablet    saccharomyces boulardii (FLORASTOR) 250 mg capsule   2  Epistaxis     3  Left shoulder strain, initial encounter  predniSONE 10 mg tablet    Ambulatory referral to Physical Therapy       Patient Instructions     Patient Instructions   Take antibiotic as directed for sinus infection  Patient does not have a current nose bleed  If nosebleeds are not improving recommend follow-up with PCP or Ear Nose and Throat doctor  Start prednisone for the shoulder  Recommend ice and gentle stretching  Physical therapy  If not improving over the next week, follow up with PCP or orthopedics  Chief Complaint     Chief Complaint   Patient presents with    Sinusitis    Shoulder Pain     left several months    Nose Bleed      several months       History of Present Illness   40-year-old female here with complaint of recurrent epistaxis for the last several months  Patient had a nosebleed earlier today  She is also reporting pain over the left maxillary sinus and also in the occipital area of the left side of her head  She is concerned that she may have a sinus infection  Reports some mild nasal congestion from time to time  No cough  Has left shoulder pain for the last several months  States that she has been aggravating it bike gardening and doing yoga  Is concerned that something might be tore  Today also noticing some increased pain in the left cervical area  Has trouble lifting things due to the pain  Review of Systems   Review of Systems   Constitutional: Negative for chills and fever  HENT: Positive for congestion, sinus pressure and sinus pain           Recurrent epistaxis Musculoskeletal: Positive for arthralgias (Left shoulder pain) and neck pain  Neurological: Negative for weakness and numbness  Current Medications     Current Outpatient Medications:     amoxicillin (AMOXIL) 875 mg tablet, Take 1 tablet (875 mg total) by mouth 2 (two) times a day for 10 days, Disp: 20 tablet, Rfl: 0    cyanocobalamin 1000 MCG tablet, Take 1 tablet (1,000 mcg total) by mouth daily (Patient not taking: Reported on 7/27/2019), Disp: 30 tablet, Rfl: 0    predniSONE 10 mg tablet, Take 3 tabs BID X 2 days, 2 tabs BID X 2 days, 1 tab BID X 2 days, 1 tab daily X 2 days, Disp: 26 tablet, Rfl: 0    saccharomyces boulardii (FLORASTOR) 250 mg capsule, Take 1 capsule (250 mg total) by mouth 2 (two) times a day for 30 days, Disp: 60 capsule, Rfl: 0    Current Allergies     Allergies as of 07/27/2019 - Reviewed 07/27/2019   Allergen Reaction Noted    Dairy aid [lactase]  03/08/2019    Nickel  04/05/2016    Other  04/05/2016    Wheat bran  03/08/2019          The following portions of the patient's history were reviewed and updated as appropriate: allergies, current medications, past family history, past medical history, past social history, past surgical history and problem list    Past Medical History:   Diagnosis Date    Depression     PTSD (post-traumatic stress disorder)      History reviewed  No pertinent surgical history  History reviewed  No pertinent family history    Social History     Socioeconomic History    Marital status: Single     Spouse name: Not on file    Number of children: Not on file    Years of education: Not on file    Highest education level: Not on file   Occupational History    Not on file   Social Needs    Financial resource strain: Not on file    Food insecurity:     Worry: Not on file     Inability: Not on file    Transportation needs:     Medical: Not on file     Non-medical: Not on file   Tobacco Use    Smoking status: Former Smoker     Packs/day: 0 25 Years: 30 00     Pack years: 7 50    Smokeless tobacco: Never Used   Substance and Sexual Activity    Alcohol use: No    Drug use: No    Sexual activity: Not on file   Lifestyle    Physical activity:     Days per week: Not on file     Minutes per session: Not on file    Stress: Not on file   Relationships    Social connections:     Talks on phone: Not on file     Gets together: Not on file     Attends Yazdanism service: Not on file     Active member of club or organization: Not on file     Attends meetings of clubs or organizations: Not on file     Relationship status: Not on file    Intimate partner violence:     Fear of current or ex partner: Not on file     Emotionally abused: Not on file     Physically abused: Not on file     Forced sexual activity: Not on file   Other Topics Concern    Not on file   Social History Narrative    Not on file     Medications have been verified  Objective   /74   Pulse 81   Temp 98 °F (36 7 °C)   Resp 16   Ht 5' 6" (1 676 m)   Wt 50 8 kg (112 lb)   SpO2 99%   BMI 18 08 kg/m²      Physical Exam   Physical Exam   Constitutional: She appears well-developed and well-nourished  No distress  HENT:   Head: Normocephalic and atraumatic  Right Ear: Tympanic membrane normal    Left Ear: Tympanic membrane normal    Nose: No mucosal edema or rhinorrhea  Epistaxis (Dried blood in left nostril) is observed  Right sinus exhibits no maxillary sinus tenderness and no frontal sinus tenderness  Left sinus exhibits maxillary sinus tenderness  Left sinus exhibits no frontal sinus tenderness  Mouth/Throat: Oropharynx is clear and moist  No oropharyngeal exudate, posterior oropharyngeal edema or posterior oropharyngeal erythema  Eyes: Conjunctivae are normal    Cardiovascular: Normal rate, regular rhythm and normal heart sounds  No murmur heard  Musculoskeletal:        Left shoulder: She exhibits decreased range of motion and tenderness          Cervical back: She exhibits decreased range of motion, tenderness (Over paraspinal muscles in the cervical area) and spasm  Arms:  Increased pain with resistance to shoulder adduction   Neurological: No sensory deficit  Nursing note and vitals reviewed

## 2019-07-27 NOTE — PATIENT INSTRUCTIONS
Take antibiotic as directed for sinus infection  Patient does not have a current nose bleed  If nosebleeds are not improving recommend follow-up with PCP or Ear Nose and Throat doctor  Start prednisone for the shoulder  Recommend ice and gentle stretching  Physical therapy  If not improving over the next week, follow up with PCP or orthopedics

## 2021-03-30 ENCOUNTER — TELEPHONE (OUTPATIENT)
Dept: FAMILY MEDICINE CLINIC | Facility: CLINIC | Age: 56
End: 2021-03-30

## 2021-03-30 NOTE — TELEPHONE ENCOUNTER
Left message for patient she is due for an appointment hasn't been seen since November 2018   She is also due for a mammogram

## 2021-04-22 ENCOUNTER — TRANSCRIBE ORDERS (OUTPATIENT)
Dept: ADMINISTRATIVE | Facility: HOSPITAL | Age: 56
End: 2021-04-22

## 2021-04-22 DIAGNOSIS — Z12.31 ENCOUNTER FOR SCREENING MAMMOGRAM FOR MALIGNANT NEOPLASM OF BREAST: Primary | ICD-10-CM
